# Patient Record
Sex: FEMALE | Race: WHITE | NOT HISPANIC OR LATINO
[De-identification: names, ages, dates, MRNs, and addresses within clinical notes are randomized per-mention and may not be internally consistent; named-entity substitution may affect disease eponyms.]

---

## 2017-11-02 ENCOUNTER — APPOINTMENT (OUTPATIENT)
Dept: INTERNAL MEDICINE | Facility: CLINIC | Age: 67
End: 2017-11-02
Payer: MEDICARE

## 2017-11-02 ENCOUNTER — NON-APPOINTMENT (OUTPATIENT)
Age: 67
End: 2017-11-02

## 2017-11-02 VITALS — SYSTOLIC BLOOD PRESSURE: 120 MMHG | DIASTOLIC BLOOD PRESSURE: 70 MMHG

## 2017-11-02 VITALS — HEIGHT: 60 IN | WEIGHT: 133 LBS | BODY MASS INDEX: 26.11 KG/M2

## 2017-11-02 DIAGNOSIS — Z80.3 FAMILY HISTORY OF MALIGNANT NEOPLASM OF BREAST: ICD-10-CM

## 2017-11-02 LAB
BILIRUB UR QL STRIP: NORMAL
CLARITY UR: CLEAR
COLLECTION METHOD: NORMAL
GLUCOSE UR-MCNC: NORMAL
HCG UR QL: 0.2 EU/DL
HGB UR QL STRIP.AUTO: NORMAL
KETONES UR-MCNC: NORMAL
LEUKOCYTE ESTERASE UR QL STRIP: NORMAL
NITRITE UR QL STRIP: NORMAL
PH UR STRIP: 6
PROT UR STRIP-MCNC: NORMAL
SP GR UR STRIP: 1.01

## 2017-11-02 PROCEDURE — 36415 COLL VENOUS BLD VENIPUNCTURE: CPT

## 2017-11-02 PROCEDURE — 99204 OFFICE O/P NEW MOD 45 MIN: CPT | Mod: 25

## 2017-11-02 PROCEDURE — 81003 URINALYSIS AUTO W/O SCOPE: CPT | Mod: QW

## 2017-11-02 PROCEDURE — 93000 ELECTROCARDIOGRAM COMPLETE: CPT

## 2017-11-02 RX ORDER — ALPRAZOLAM 0.25 MG/1
0.25 TABLET ORAL
Refills: 0 | Status: DISCONTINUED | COMMUNITY
End: 2017-11-02

## 2017-11-03 ENCOUNTER — LABORATORY RESULT (OUTPATIENT)
Age: 67
End: 2017-11-03

## 2017-11-03 ENCOUNTER — APPOINTMENT (OUTPATIENT)
Dept: INTERNAL MEDICINE | Facility: CLINIC | Age: 67
End: 2017-11-03
Payer: MEDICARE

## 2017-11-03 LAB
ALBUMIN SERPL ELPH-MCNC: 4 G/DL
ALP BLD-CCNC: 45 U/L
ALT SERPL-CCNC: 14 U/L
ANION GAP SERPL CALC-SCNC: 15 MMOL/L
AST SERPL-CCNC: 19 U/L
BASOPHILS # BLD AUTO: 0.02 K/UL
BASOPHILS NFR BLD AUTO: 0.3 %
BILIRUB SERPL-MCNC: 0.3 MG/DL
BUN SERPL-MCNC: 17 MG/DL
CALCIUM SERPL-MCNC: 9.4 MG/DL
CHLORIDE SERPL-SCNC: 101 MMOL/L
CHOLEST SERPL-MCNC: 182 MG/DL
CHOLEST/HDLC SERPL: 5.1 RATIO
CO2 SERPL-SCNC: 25 MMOL/L
CREAT SERPL-MCNC: 0.65 MG/DL
EOSINOPHIL # BLD AUTO: 0.21 K/UL
EOSINOPHIL NFR BLD AUTO: 2.8 %
GLUCOSE SERPL-MCNC: 85 MG/DL
HBA1C MFR BLD HPLC: 5.3 %
HCT VFR BLD CALC: 42.8 %
HDLC SERPL-MCNC: 36 MG/DL
HGB BLD-MCNC: 14.1 G/DL
IMM GRANULOCYTES NFR BLD AUTO: 0.4 %
LDLC SERPL CALC-MCNC: 122 MG/DL
LYMPHOCYTES # BLD AUTO: 1.88 K/UL
LYMPHOCYTES NFR BLD AUTO: 25.3 %
MAN DIFF?: NORMAL
MCHC RBC-ENTMCNC: 29.2 PG
MCHC RBC-ENTMCNC: 32.9 GM/DL
MCV RBC AUTO: 88.6 FL
MONOCYTES # BLD AUTO: 0.57 K/UL
MONOCYTES NFR BLD AUTO: 7.7 %
NEUTROPHILS # BLD AUTO: 4.73 K/UL
NEUTROPHILS NFR BLD AUTO: 63.5 %
PLATELET # BLD AUTO: 244 K/UL
POTASSIUM SERPL-SCNC: 4.5 MMOL/L
PROT SERPL-MCNC: 6.9 G/DL
RBC # BLD: 4.83 M/UL
RBC # FLD: 14.3 %
SAVE SPECIMEN: NORMAL
SODIUM SERPL-SCNC: 141 MMOL/L
TRIGL SERPL-MCNC: 119 MG/DL
URATE SERPL-MCNC: 4 MG/DL
WBC # FLD AUTO: 7.44 K/UL

## 2017-11-03 PROCEDURE — 36415 COLL VENOUS BLD VENIPUNCTURE: CPT

## 2017-11-07 LAB
25(OH)D3 SERPL-MCNC: 48.2 NG/ML
FERRITIN SERPL-MCNC: 75 NG/ML
T3RU NFR SERPL: 1.08 INDEX
T4 SERPL-MCNC: 6.7 UG/DL
TSH SERPL-ACNC: 1.59 UIU/ML
VIT B12 SERPL-MCNC: 951 PG/ML

## 2017-11-08 ENCOUNTER — APPOINTMENT (OUTPATIENT)
Dept: CARDIOLOGY | Facility: CLINIC | Age: 67
End: 2017-11-08
Payer: MEDICARE

## 2017-11-08 PROCEDURE — 93306 TTE W/DOPPLER COMPLETE: CPT

## 2017-11-11 ENCOUNTER — TRANSCRIPTION ENCOUNTER (OUTPATIENT)
Age: 67
End: 2017-11-11

## 2017-11-11 ENCOUNTER — FORM ENCOUNTER (OUTPATIENT)
Age: 67
End: 2017-11-11

## 2017-11-12 ENCOUNTER — OUTPATIENT (OUTPATIENT)
Dept: OUTPATIENT SERVICES | Facility: HOSPITAL | Age: 67
LOS: 1 days | End: 2017-11-12
Payer: MEDICARE

## 2017-11-12 ENCOUNTER — APPOINTMENT (OUTPATIENT)
Dept: MRI IMAGING | Facility: IMAGING CENTER | Age: 67
End: 2017-11-12
Payer: MEDICARE

## 2017-11-12 DIAGNOSIS — H81.10 BENIGN PAROXYSMAL VERTIGO, UNSPECIFIED EAR: ICD-10-CM

## 2017-11-12 DIAGNOSIS — H91.93 UNSPECIFIED HEARING LOSS, BILATERAL: ICD-10-CM

## 2017-11-12 PROCEDURE — 70551 MRI BRAIN STEM W/O DYE: CPT

## 2017-11-12 PROCEDURE — 70551 MRI BRAIN STEM W/O DYE: CPT | Mod: 26

## 2017-11-30 ENCOUNTER — RESULT REVIEW (OUTPATIENT)
Age: 67
End: 2017-11-30

## 2017-12-11 ENCOUNTER — APPOINTMENT (OUTPATIENT)
Dept: INTERNAL MEDICINE | Facility: CLINIC | Age: 67
End: 2017-12-11
Payer: MEDICARE

## 2017-12-11 ENCOUNTER — CLINICAL ADVICE (OUTPATIENT)
Age: 67
End: 2017-12-11

## 2017-12-11 VITALS — BODY MASS INDEX: 25.91 KG/M2 | HEIGHT: 60 IN | WEIGHT: 132 LBS

## 2017-12-11 VITALS — DIASTOLIC BLOOD PRESSURE: 70 MMHG | SYSTOLIC BLOOD PRESSURE: 120 MMHG

## 2017-12-11 PROCEDURE — 99214 OFFICE O/P EST MOD 30 MIN: CPT

## 2018-03-15 ENCOUNTER — LABORATORY RESULT (OUTPATIENT)
Age: 68
End: 2018-03-15

## 2018-03-15 ENCOUNTER — APPOINTMENT (OUTPATIENT)
Dept: INTERNAL MEDICINE | Facility: CLINIC | Age: 68
End: 2018-03-15
Payer: MEDICARE

## 2018-03-15 DIAGNOSIS — H91.93 UNSPECIFIED HEARING LOSS, BILATERAL: ICD-10-CM

## 2018-03-15 PROCEDURE — 36415 COLL VENOUS BLD VENIPUNCTURE: CPT

## 2018-03-16 LAB
25(OH)D3 SERPL-MCNC: 58.2 NG/ML
ALBUMIN SERPL ELPH-MCNC: 4.2 G/DL
ALP BLD-CCNC: 48 U/L
ALT SERPL-CCNC: 10 U/L
ANION GAP SERPL CALC-SCNC: 12 MMOL/L
AST SERPL-CCNC: 20 U/L
BASOPHILS # BLD AUTO: 0.02 K/UL
BASOPHILS NFR BLD AUTO: 0.3 %
BILIRUB SERPL-MCNC: 0.3 MG/DL
BUN SERPL-MCNC: 17 MG/DL
CALCIUM SERPL-MCNC: 10.2 MG/DL
CHLORIDE SERPL-SCNC: 99 MMOL/L
CHOLEST SERPL-MCNC: 190 MG/DL
CHOLEST/HDLC SERPL: 4.9 RATIO
CO2 SERPL-SCNC: 27 MMOL/L
CREAT SERPL-MCNC: 0.62 MG/DL
EOSINOPHIL # BLD AUTO: 0.12 K/UL
EOSINOPHIL NFR BLD AUTO: 1.9 %
GLUCOSE SERPL-MCNC: 87 MG/DL
HBA1C MFR BLD HPLC: 5.3 %
HCT VFR BLD CALC: 45.2 %
HDLC SERPL-MCNC: 39 MG/DL
HGB BLD-MCNC: 14.1 G/DL
IMM GRANULOCYTES NFR BLD AUTO: 0.3 %
LDLC SERPL CALC-MCNC: 128 MG/DL
LYMPHOCYTES # BLD AUTO: 1.84 K/UL
LYMPHOCYTES NFR BLD AUTO: 28.4 %
MAN DIFF?: NORMAL
MCHC RBC-ENTMCNC: 29 PG
MCHC RBC-ENTMCNC: 31.2 GM/DL
MCV RBC AUTO: 92.8 FL
MONOCYTES # BLD AUTO: 0.42 K/UL
MONOCYTES NFR BLD AUTO: 6.5 %
NEUTROPHILS # BLD AUTO: 4.05 K/UL
NEUTROPHILS NFR BLD AUTO: 62.6 %
PLATELET # BLD AUTO: 253 K/UL
POTASSIUM SERPL-SCNC: 5 MMOL/L
PROT SERPL-MCNC: 7.2 G/DL
RBC # BLD: 4.87 M/UL
RBC # FLD: 15.1 %
SAVE SPECIMEN: NORMAL
SODIUM SERPL-SCNC: 138 MMOL/L
T3RU NFR SERPL: 1.06 INDEX
TRIGL SERPL-MCNC: 113 MG/DL
TSH SERPL-ACNC: 1.65 UIU/ML
URATE SERPL-MCNC: 4.7 MG/DL
WBC # FLD AUTO: 6.47 K/UL

## 2018-03-19 ENCOUNTER — RX RENEWAL (OUTPATIENT)
Age: 68
End: 2018-03-19

## 2018-03-19 ENCOUNTER — MEDICATION RENEWAL (OUTPATIENT)
Age: 68
End: 2018-03-19

## 2018-05-04 ENCOUNTER — RX RENEWAL (OUTPATIENT)
Age: 68
End: 2018-05-04

## 2018-08-01 ENCOUNTER — LABORATORY RESULT (OUTPATIENT)
Age: 68
End: 2018-08-01

## 2018-08-01 ENCOUNTER — APPOINTMENT (OUTPATIENT)
Dept: INTERNAL MEDICINE | Facility: CLINIC | Age: 68
End: 2018-08-01
Payer: MEDICARE

## 2018-08-01 PROCEDURE — 36415 COLL VENOUS BLD VENIPUNCTURE: CPT

## 2018-08-03 LAB
ALBUMIN SERPL ELPH-MCNC: 4.3 G/DL
ALP BLD-CCNC: 54 U/L
ALT SERPL-CCNC: 18 U/L
ANION GAP SERPL CALC-SCNC: 12 MMOL/L
AST SERPL-CCNC: 17 U/L
BILIRUB SERPL-MCNC: 0.3 MG/DL
BUN SERPL-MCNC: 16 MG/DL
CALCIUM SERPL-MCNC: 9.3 MG/DL
CHLORIDE SERPL-SCNC: 97 MMOL/L
CHOLEST SERPL-MCNC: 176 MG/DL
CHOLEST/HDLC SERPL: 5.3 RATIO
CO2 SERPL-SCNC: 28 MMOL/L
CREAT SERPL-MCNC: 0.48 MG/DL
GLUCOSE SERPL-MCNC: 92 MG/DL
HDLC SERPL-MCNC: 33 MG/DL
LDLC SERPL CALC-MCNC: 115 MG/DL
POTASSIUM SERPL-SCNC: 4.6 MMOL/L
PROT SERPL-MCNC: 6.8 G/DL
SODIUM SERPL-SCNC: 137 MMOL/L
TRIGL SERPL-MCNC: 140 MG/DL

## 2018-08-19 ENCOUNTER — FORM ENCOUNTER (OUTPATIENT)
Age: 68
End: 2018-08-19

## 2018-08-20 ENCOUNTER — RX RENEWAL (OUTPATIENT)
Age: 68
End: 2018-08-20

## 2018-08-20 ENCOUNTER — APPOINTMENT (OUTPATIENT)
Dept: RADIOLOGY | Facility: IMAGING CENTER | Age: 68
End: 2018-08-20
Payer: MEDICARE

## 2018-08-20 ENCOUNTER — APPOINTMENT (OUTPATIENT)
Dept: INTERNAL MEDICINE | Facility: CLINIC | Age: 68
End: 2018-08-20
Payer: MEDICARE

## 2018-08-20 ENCOUNTER — OUTPATIENT (OUTPATIENT)
Dept: OUTPATIENT SERVICES | Facility: HOSPITAL | Age: 68
LOS: 1 days | End: 2018-08-20
Payer: MEDICARE

## 2018-08-20 VITALS — SYSTOLIC BLOOD PRESSURE: 120 MMHG | DIASTOLIC BLOOD PRESSURE: 70 MMHG

## 2018-08-20 DIAGNOSIS — M54.12 RADICULOPATHY, CERVICAL REGION: ICD-10-CM

## 2018-08-20 PROCEDURE — 99214 OFFICE O/P EST MOD 30 MIN: CPT

## 2018-08-20 PROCEDURE — 72040 X-RAY EXAM NECK SPINE 2-3 VW: CPT

## 2018-08-20 PROCEDURE — 72040 X-RAY EXAM NECK SPINE 2-3 VW: CPT | Mod: 26

## 2018-08-20 NOTE — HISTORY OF PRESENT ILLNESS
[de-identified] : This is a patient with history of mild aortic insufficiency, osteoporosis, vertigo who's here today because of a painful cervical radiculopathy. She denies any neurological deficits headaches or diplopia. She denies stiffness of the neck or fever. Denies travels out of the country. The patient lifted a heavy object and then developed pain in the cervical area. She'll recent MRI of the brain which was normal.

## 2018-08-20 NOTE — PHYSICAL EXAM
[Normal Sclera/Conjunctiva] : normal sclera/conjunctiva [EOMI] : extraocular movements intact [Normal Outer Ear/Nose] : the outer ears and nose were normal in appearance [Normal TMs] : both tympanic membranes were normal [No Respiratory Distress] : no respiratory distress  [Clear to Auscultation] : lungs were clear to auscultation bilaterally [No Accessory Muscle Use] : no accessory muscle use [No Carotid Bruits] : no carotid bruits [No Abdominal Bruit] : a ~M bruit was not heard ~T in the abdomen [Normal Gait] : normal gait [Coordination Grossly Intact] : coordination grossly intact [No Focal Deficits] : no focal deficits [Deep Tendon Reflexes (DTR)] : deep tendon reflexes were 2+ and symmetric [de-identified] : Grade 1/6 syr

## 2018-08-20 NOTE — ASSESSMENT
[FreeTextEntry1] : The patient's vital signs were stable. Her physical examination including a detailed neurological examination was normal. My feeling is that the patient probably has a cervical radiculopathy. I referred her for x-rays of her cervical spine and started her on Flexeril 10 mg t.i.d. and also Motrin to be taken with food 400 mg t.i.d. and bedrest.

## 2018-08-20 NOTE — REVIEW OF SYSTEMS
[Shortness Of Breath] : no shortness of breath [Cough] : no cough [Dyspnea on Exertion] : no dyspnea on exertion [Joint Pain] : joint pain [Joint Stiffness] : joint stiffness [Muscle Weakness] : no muscle weakness [Muscle Pain] : muscle pain [Back Pain] : back pain [Dizziness] : dizziness [Confusion] : no confusion [Anxiety] : anxiety [Negative] : Cardiovascular [FreeTextEntry1] : Neck pain

## 2018-11-01 ENCOUNTER — APPOINTMENT (OUTPATIENT)
Dept: INTERNAL MEDICINE | Facility: CLINIC | Age: 68
End: 2018-11-01
Payer: MEDICARE

## 2018-11-01 ENCOUNTER — LABORATORY RESULT (OUTPATIENT)
Age: 68
End: 2018-11-01

## 2018-11-01 LAB
25(OH)D3 SERPL-MCNC: 68.3 NG/ML
ALBUMIN SERPL ELPH-MCNC: 4.4 G/DL
ALP BLD-CCNC: 59 U/L
ALT SERPL-CCNC: 16 U/L
ANION GAP SERPL CALC-SCNC: 11 MMOL/L
AST SERPL-CCNC: 18 U/L
BASOPHILS # BLD AUTO: 0.02 K/UL
BASOPHILS NFR BLD AUTO: 0.3 %
BILIRUB SERPL-MCNC: 0.4 MG/DL
BUN SERPL-MCNC: 18 MG/DL
CALCIUM SERPL-MCNC: 9.9 MG/DL
CHLORIDE SERPL-SCNC: 101 MMOL/L
CHOLEST SERPL-MCNC: 184 MG/DL
CHOLEST/HDLC SERPL: 4.8 RATIO
CO2 SERPL-SCNC: 30 MMOL/L
CREAT SERPL-MCNC: 0.62 MG/DL
EOSINOPHIL # BLD AUTO: 0.17 K/UL
EOSINOPHIL NFR BLD AUTO: 2.7 %
HBA1C MFR BLD HPLC: 4.9 %
HCT VFR BLD CALC: 40.8 %
HDLC SERPL-MCNC: 38 MG/DL
HGB BLD-MCNC: 13.5 G/DL
IMM GRANULOCYTES NFR BLD AUTO: 0.2 %
LDLC SERPL CALC-MCNC: 123 MG/DL
LYMPHOCYTES # BLD AUTO: 2 K/UL
LYMPHOCYTES NFR BLD AUTO: 31.4 %
MAN DIFF?: NORMAL
MCHC RBC-ENTMCNC: 28.4 PG
MCHC RBC-ENTMCNC: 33.1 GM/DL
MCV RBC AUTO: 85.7 FL
MONOCYTES # BLD AUTO: 0.42 K/UL
MONOCYTES NFR BLD AUTO: 6.6 %
NEUTROPHILS # BLD AUTO: 3.74 K/UL
NEUTROPHILS NFR BLD AUTO: 58.8 %
PLATELET # BLD AUTO: 249 K/UL
POTASSIUM SERPL-SCNC: 5 MMOL/L
PROT SERPL-MCNC: 7.3 G/DL
RBC # BLD: 4.76 M/UL
RBC # FLD: 14.4 %
SAVE SPECIMEN: NORMAL
SODIUM SERPL-SCNC: 142 MMOL/L
TRIGL SERPL-MCNC: 116 MG/DL
URATE SERPL-MCNC: 5.1 MG/DL
WBC # FLD AUTO: 6.36 K/UL

## 2018-11-01 PROCEDURE — 36415 COLL VENOUS BLD VENIPUNCTURE: CPT

## 2018-11-02 LAB
FERRITIN SERPL-MCNC: 93 NG/ML
GLUCOSE SERPL-MCNC: 86 MG/DL
T3RU NFR SERPL: 1.1 INDEX
TSH SERPL-ACNC: 1.98 UIU/ML
VIT B12 SERPL-MCNC: 1028 PG/ML

## 2018-11-08 ENCOUNTER — NON-APPOINTMENT (OUTPATIENT)
Age: 68
End: 2018-11-08

## 2018-11-08 ENCOUNTER — APPOINTMENT (OUTPATIENT)
Dept: INTERNAL MEDICINE | Facility: CLINIC | Age: 68
End: 2018-11-08
Payer: MEDICARE

## 2018-11-08 ENCOUNTER — CLINICAL ADVICE (OUTPATIENT)
Age: 68
End: 2018-11-08

## 2018-11-08 VITALS — DIASTOLIC BLOOD PRESSURE: 70 MMHG | SYSTOLIC BLOOD PRESSURE: 120 MMHG

## 2018-11-08 VITALS — WEIGHT: 137 LBS | BODY MASS INDEX: 26.9 KG/M2 | HEIGHT: 60 IN

## 2018-11-08 LAB
BILIRUB UR QL STRIP: NORMAL
CLARITY UR: CLEAR
COLLECTION METHOD: NORMAL
GLUCOSE UR-MCNC: NORMAL
HCG UR QL: 0.2 EU/DL
HGB UR QL STRIP.AUTO: NORMAL
KETONES UR-MCNC: NORMAL
LEUKOCYTE ESTERASE UR QL STRIP: NORMAL
NITRITE UR QL STRIP: NORMAL
PH UR STRIP: 7.5
PROT UR STRIP-MCNC: NORMAL
SP GR UR STRIP: 1.01

## 2018-11-08 PROCEDURE — 36415 COLL VENOUS BLD VENIPUNCTURE: CPT

## 2018-11-08 PROCEDURE — G0439: CPT | Mod: GA

## 2018-11-08 PROCEDURE — 93000 ELECTROCARDIOGRAM COMPLETE: CPT

## 2018-11-18 ENCOUNTER — RX RENEWAL (OUTPATIENT)
Age: 68
End: 2018-11-18

## 2019-01-31 ENCOUNTER — MEDICATION RENEWAL (OUTPATIENT)
Age: 69
End: 2019-01-31

## 2019-02-06 ENCOUNTER — APPOINTMENT (OUTPATIENT)
Dept: INTERNAL MEDICINE | Facility: CLINIC | Age: 69
End: 2019-02-06
Payer: MEDICARE

## 2019-02-06 LAB — SAVE SPECIMEN: NORMAL

## 2019-02-06 PROCEDURE — 36415 COLL VENOUS BLD VENIPUNCTURE: CPT

## 2019-02-07 LAB
ALBUMIN SERPL ELPH-MCNC: 4.3 G/DL
ALP BLD-CCNC: 57 U/L
ALT SERPL-CCNC: 13 U/L
ANION GAP SERPL CALC-SCNC: 14 MMOL/L
AST SERPL-CCNC: 16 U/L
BILIRUB SERPL-MCNC: 0.4 MG/DL
BUN SERPL-MCNC: 18 MG/DL
CALCIUM SERPL-MCNC: 9.7 MG/DL
CHLORIDE SERPL-SCNC: 100 MMOL/L
CHOLEST SERPL-MCNC: 188 MG/DL
CHOLEST/HDLC SERPL: 5.2 RATIO
CO2 SERPL-SCNC: 26 MMOL/L
CREAT SERPL-MCNC: 0.57 MG/DL
GLUCOSE SERPL-MCNC: 93 MG/DL
HDLC SERPL-MCNC: 36 MG/DL
LDLC SERPL CALC-MCNC: 127 MG/DL
POTASSIUM SERPL-SCNC: 4.1 MMOL/L
PROT SERPL-MCNC: 7.1 G/DL
SODIUM SERPL-SCNC: 140 MMOL/L
TRIGL SERPL-MCNC: 126 MG/DL

## 2019-02-15 ENCOUNTER — APPOINTMENT (OUTPATIENT)
Dept: INTERNAL MEDICINE | Facility: CLINIC | Age: 69
End: 2019-02-15
Payer: MEDICARE

## 2019-02-15 VITALS — HEIGHT: 60 IN | WEIGHT: 142 LBS | BODY MASS INDEX: 27.88 KG/M2

## 2019-02-15 VITALS — SYSTOLIC BLOOD PRESSURE: 130 MMHG | DIASTOLIC BLOOD PRESSURE: 70 MMHG

## 2019-02-15 DIAGNOSIS — R01.1 CARDIAC MURMUR, UNSPECIFIED: ICD-10-CM

## 2019-02-15 PROCEDURE — 99214 OFFICE O/P EST MOD 30 MIN: CPT

## 2019-02-15 NOTE — PHYSICAL EXAM
[Normal Sclera/Conjunctiva] : normal sclera/conjunctiva [EOMI] : extraocular movements intact [Normal TMs] : both tympanic membranes were normal [No Respiratory Distress] : no respiratory distress  [Clear to Auscultation] : lungs were clear to auscultation bilaterally [No Accessory Muscle Use] : no accessory muscle use [No Carotid Bruits] : no carotid bruits [No Abdominal Bruit] : a ~M bruit was not heard ~T in the abdomen [Normal Gait] : normal gait [Coordination Grossly Intact] : coordination grossly intact [No Focal Deficits] : no focal deficits [Deep Tendon Reflexes (DTR)] : deep tendon reflexes were 2+ and symmetric [de-identified] : Grade 1/6 syr

## 2019-02-15 NOTE — ASSESSMENT
[FreeTextEntry1] : Problems\par \par Hypercholesterolemia- the patient's blood was drawn and the results showed that she is doing very well on Crestor. She was advised to repeat the blood in 3 months\par Excessive snoring- she was referred for a sleep apnea evaluation\par Mood disorder- she continues on her Zoloft and p.r.n. Xanax\par Heart murmur- an echocardiogram was ordered

## 2019-03-19 ENCOUNTER — TRANSCRIPTION ENCOUNTER (OUTPATIENT)
Age: 69
End: 2019-03-19

## 2019-04-12 ENCOUNTER — RX RENEWAL (OUTPATIENT)
Age: 69
End: 2019-04-12

## 2019-04-29 ENCOUNTER — FORM ENCOUNTER (OUTPATIENT)
Age: 69
End: 2019-04-29

## 2019-04-30 ENCOUNTER — APPOINTMENT (OUTPATIENT)
Dept: CT IMAGING | Facility: IMAGING CENTER | Age: 69
End: 2019-04-30
Payer: MEDICARE

## 2019-04-30 ENCOUNTER — OUTPATIENT (OUTPATIENT)
Dept: OUTPATIENT SERVICES | Facility: HOSPITAL | Age: 69
LOS: 1 days | End: 2019-04-30
Payer: MEDICARE

## 2019-04-30 DIAGNOSIS — J38.01 PARALYSIS OF VOCAL CORDS AND LARYNX, UNILATERAL: ICD-10-CM

## 2019-04-30 PROCEDURE — 71260 CT THORAX DX C+: CPT

## 2019-04-30 PROCEDURE — 70491 CT SOFT TISSUE NECK W/DYE: CPT

## 2019-04-30 PROCEDURE — 70491 CT SOFT TISSUE NECK W/DYE: CPT | Mod: 26

## 2019-04-30 PROCEDURE — 71260 CT THORAX DX C+: CPT | Mod: 26

## 2019-04-30 PROCEDURE — 82565 ASSAY OF CREATININE: CPT

## 2019-05-07 ENCOUNTER — APPOINTMENT (OUTPATIENT)
Dept: MRI IMAGING | Facility: CLINIC | Age: 69
End: 2019-05-07
Payer: MEDICARE

## 2019-05-07 ENCOUNTER — OUTPATIENT (OUTPATIENT)
Dept: OUTPATIENT SERVICES | Facility: HOSPITAL | Age: 69
LOS: 1 days | End: 2019-05-07
Payer: MEDICARE

## 2019-05-07 DIAGNOSIS — Z00.8 ENCOUNTER FOR OTHER GENERAL EXAMINATION: ICD-10-CM

## 2019-05-07 PROCEDURE — A9585: CPT

## 2019-05-07 PROCEDURE — 70543 MRI ORBT/FAC/NCK W/O &W/DYE: CPT

## 2019-05-07 PROCEDURE — 70543 MRI ORBT/FAC/NCK W/O &W/DYE: CPT | Mod: 26

## 2019-05-29 ENCOUNTER — APPOINTMENT (OUTPATIENT)
Dept: INTERNAL MEDICINE | Facility: CLINIC | Age: 69
End: 2019-05-29
Payer: MEDICARE

## 2019-05-29 LAB
BASOPHILS # BLD AUTO: 0.04 K/UL
BASOPHILS NFR BLD AUTO: 0.5 %
EOSINOPHIL # BLD AUTO: 0.21 K/UL
EOSINOPHIL NFR BLD AUTO: 2.6 %
HCT VFR BLD CALC: 43.2 %
HGB BLD-MCNC: 13.8 G/DL
IMM GRANULOCYTES NFR BLD AUTO: 0.5 %
LYMPHOCYTES # BLD AUTO: 1.91 K/UL
LYMPHOCYTES NFR BLD AUTO: 23.2 %
MAN DIFF?: NORMAL
MCHC RBC-ENTMCNC: 28.4 PG
MCHC RBC-ENTMCNC: 31.9 GM/DL
MCV RBC AUTO: 88.9 FL
MONOCYTES # BLD AUTO: 0.63 K/UL
MONOCYTES NFR BLD AUTO: 7.7 %
NEUTROPHILS # BLD AUTO: 5.39 K/UL
NEUTROPHILS NFR BLD AUTO: 65.5 %
PLATELET # BLD AUTO: 246 K/UL
RBC # BLD: 4.86 M/UL
RBC # FLD: 14.4 %
SAVE SPECIMEN: NORMAL
WBC # FLD AUTO: 8.22 K/UL

## 2019-05-29 PROCEDURE — 36415 COLL VENOUS BLD VENIPUNCTURE: CPT

## 2019-05-30 LAB
ALBUMIN SERPL ELPH-MCNC: 4.1 G/DL
ALP BLD-CCNC: 72 U/L
ALT SERPL-CCNC: 28 U/L
ANION GAP SERPL CALC-SCNC: 10 MMOL/L
AST SERPL-CCNC: 19 U/L
BILIRUB SERPL-MCNC: 0.3 MG/DL
BUN SERPL-MCNC: 12 MG/DL
CALCIUM SERPL-MCNC: 9.7 MG/DL
CHLORIDE SERPL-SCNC: 101 MMOL/L
CHOLEST SERPL-MCNC: 177 MG/DL
CHOLEST/HDLC SERPL: 6.8 RATIO
CO2 SERPL-SCNC: 29 MMOL/L
CREAT SERPL-MCNC: 0.54 MG/DL
GLUCOSE SERPL-MCNC: 103 MG/DL
HDLC SERPL-MCNC: 26 MG/DL
LDLC SERPL CALC-MCNC: 103 MG/DL
POTASSIUM SERPL-SCNC: 4.4 MMOL/L
PROT SERPL-MCNC: 6.5 G/DL
SODIUM SERPL-SCNC: 140 MMOL/L
TRIGL SERPL-MCNC: 242 MG/DL

## 2019-07-11 ENCOUNTER — OUTPATIENT (OUTPATIENT)
Dept: OUTPATIENT SERVICES | Facility: HOSPITAL | Age: 69
LOS: 1 days | End: 2019-07-11
Payer: MEDICARE

## 2019-07-11 ENCOUNTER — APPOINTMENT (OUTPATIENT)
Dept: MRI IMAGING | Facility: CLINIC | Age: 69
End: 2019-07-11
Payer: MEDICARE

## 2019-07-11 DIAGNOSIS — Z00.8 ENCOUNTER FOR OTHER GENERAL EXAMINATION: ICD-10-CM

## 2019-07-11 PROCEDURE — 70544 MR ANGIOGRAPHY HEAD W/O DYE: CPT

## 2019-07-11 PROCEDURE — 70544 MR ANGIOGRAPHY HEAD W/O DYE: CPT | Mod: 26

## 2019-08-06 ENCOUNTER — MEDICATION RENEWAL (OUTPATIENT)
Age: 69
End: 2019-08-06

## 2019-09-18 ENCOUNTER — APPOINTMENT (OUTPATIENT)
Dept: INTERNAL MEDICINE | Facility: CLINIC | Age: 69
End: 2019-09-18
Payer: MEDICARE

## 2019-09-18 PROCEDURE — 36415 COLL VENOUS BLD VENIPUNCTURE: CPT

## 2019-09-22 LAB
CHOLEST SERPL-MCNC: 158 MG/DL
CHOLEST/HDLC SERPL: 4.4 RATIO
HDLC SERPL-MCNC: 36 MG/DL
LDLC SERPL CALC-MCNC: 95 MG/DL
TRIGL SERPL-MCNC: 134 MG/DL

## 2019-09-30 ENCOUNTER — RX RENEWAL (OUTPATIENT)
Age: 69
End: 2019-09-30

## 2019-11-06 ENCOUNTER — LABORATORY RESULT (OUTPATIENT)
Age: 69
End: 2019-11-06

## 2019-11-06 ENCOUNTER — APPOINTMENT (OUTPATIENT)
Dept: INTERNAL MEDICINE | Facility: CLINIC | Age: 69
End: 2019-11-06
Payer: MEDICARE

## 2019-11-06 PROCEDURE — 36415 COLL VENOUS BLD VENIPUNCTURE: CPT

## 2019-11-07 LAB
25(OH)D3 SERPL-MCNC: 39.7 NG/ML
ALBUMIN SERPL ELPH-MCNC: 4.5 G/DL
ALP BLD-CCNC: 64 U/L
ALT SERPL-CCNC: 18 U/L
ANION GAP SERPL CALC-SCNC: 12 MMOL/L
AST SERPL-CCNC: 17 U/L
BASOPHILS # BLD AUTO: 0.02 K/UL
BASOPHILS NFR BLD AUTO: 0.3 %
BILIRUB SERPL-MCNC: 0.3 MG/DL
BUN SERPL-MCNC: 17 MG/DL
CALCIUM SERPL-MCNC: 9.6 MG/DL
CHLORIDE SERPL-SCNC: 99 MMOL/L
CHOLEST SERPL-MCNC: 184 MG/DL
CHOLEST/HDLC SERPL: 5 RATIO
CO2 SERPL-SCNC: 29 MMOL/L
CREAT SERPL-MCNC: 0.57 MG/DL
EOSINOPHIL # BLD AUTO: 0.22 K/UL
EOSINOPHIL NFR BLD AUTO: 3.2 %
ESTIMATED AVERAGE GLUCOSE: 103 MG/DL
FERRITIN SERPL-MCNC: 74 NG/ML
GLUCOSE SERPL-MCNC: 81 MG/DL
HBA1C MFR BLD HPLC: 5.2 %
HCT VFR BLD CALC: 42.8 %
HDLC SERPL-MCNC: 37 MG/DL
HGB BLD-MCNC: 13.6 G/DL
IMM GRANULOCYTES NFR BLD AUTO: 0.3 %
LDLC SERPL CALC-MCNC: 119 MG/DL
LYMPHOCYTES # BLD AUTO: 1.77 K/UL
LYMPHOCYTES NFR BLD AUTO: 25.4 %
MAN DIFF?: NORMAL
MCHC RBC-ENTMCNC: 28.4 PG
MCHC RBC-ENTMCNC: 31.8 GM/DL
MCV RBC AUTO: 89.4 FL
MONOCYTES # BLD AUTO: 0.5 K/UL
MONOCYTES NFR BLD AUTO: 7.2 %
NEUTROPHILS # BLD AUTO: 4.43 K/UL
NEUTROPHILS NFR BLD AUTO: 63.6 %
PLATELET # BLD AUTO: 263 K/UL
POTASSIUM SERPL-SCNC: 3.9 MMOL/L
PROT SERPL-MCNC: 6.8 G/DL
RBC # BLD: 4.79 M/UL
RBC # FLD: 14.3 %
SODIUM SERPL-SCNC: 140 MMOL/L
T3RU NFR SERPL: 1 TBI
T4 SERPL-MCNC: 5.6 UG/DL
TRIGL SERPL-MCNC: 139 MG/DL
TSH SERPL-ACNC: 2 UIU/ML
URATE SERPL-MCNC: 4.6 MG/DL
VIT B12 SERPL-MCNC: 1078 PG/ML
WBC # FLD AUTO: 6.96 K/UL

## 2019-11-08 ENCOUNTER — RX RENEWAL (OUTPATIENT)
Age: 69
End: 2019-11-08

## 2019-11-14 ENCOUNTER — NON-APPOINTMENT (OUTPATIENT)
Age: 69
End: 2019-11-14

## 2019-11-14 ENCOUNTER — APPOINTMENT (OUTPATIENT)
Dept: INTERNAL MEDICINE | Facility: CLINIC | Age: 69
End: 2019-11-14
Payer: MEDICARE

## 2019-11-14 VITALS — HEIGHT: 60 IN | WEIGHT: 124 LBS | BODY MASS INDEX: 24.35 KG/M2

## 2019-11-14 VITALS — SYSTOLIC BLOOD PRESSURE: 130 MMHG | DIASTOLIC BLOOD PRESSURE: 70 MMHG

## 2019-11-14 DIAGNOSIS — M54.12 RADICULOPATHY, CERVICAL REGION: ICD-10-CM

## 2019-11-14 PROCEDURE — G0439: CPT | Mod: GA

## 2019-11-14 PROCEDURE — 93000 ELECTROCARDIOGRAM COMPLETE: CPT | Mod: 59

## 2019-11-14 PROCEDURE — 99213 OFFICE O/P EST LOW 20 MIN: CPT | Mod: 25

## 2019-11-14 RX ORDER — ROSUVASTATIN CALCIUM 5 MG/1
5 TABLET, FILM COATED ORAL
Qty: 90 | Refills: 3 | Status: DISCONTINUED | COMMUNITY
Start: 2017-11-02 | End: 2019-11-14

## 2019-11-14 NOTE — HISTORY OF PRESENT ILLNESS
[de-identified] : This is a 69-year-old patient with a history of cervical arthritis, sleep apnea, hypercholesterolemia who is here today for her annual examination

## 2019-11-14 NOTE — PHYSICAL EXAM
[No Acute Distress] : no acute distress [Well Nourished] : well nourished [Well Developed] : well developed [Well-Appearing] : well-appearing [Normal Sclera/Conjunctiva] : normal sclera/conjunctiva [PERRL] : pupils equal round and reactive to light [EOMI] : extraocular movements intact [Normal Outer Ear/Nose] : the outer ears and nose were normal in appearance [Normal Oropharynx] : the oropharynx was normal [No JVD] : no jugular venous distention [No Lymphadenopathy] : no lymphadenopathy [Supple] : supple [Thyroid Normal, No Nodules] : the thyroid was normal and there were no nodules present [No Respiratory Distress] : no respiratory distress  [No Accessory Muscle Use] : no accessory muscle use [Clear to Auscultation] : lungs were clear to auscultation bilaterally [Normal Rate] : normal rate  [Regular Rhythm] : with a regular rhythm [Normal S1, S2] : normal S1 and S2 [No Murmur] : no murmur heard [No Carotid Bruits] : no carotid bruits [No Abdominal Bruit] : a ~M bruit was not heard ~T in the abdomen [No Varicosities] : no varicosities [Pedal Pulses Present] : the pedal pulses are present [No Edema] : there was no peripheral edema [No Palpable Aorta] : no palpable aorta [No Extremity Clubbing/Cyanosis] : no extremity clubbing/cyanosis [Soft] : abdomen soft [Non Tender] : non-tender [Non-distended] : non-distended [No Masses] : no abdominal mass palpated [No HSM] : no HSM [Normal Bowel Sounds] : normal bowel sounds [Normal Posterior Cervical Nodes] : no posterior cervical lymphadenopathy [Normal Anterior Cervical Nodes] : no anterior cervical lymphadenopathy [No CVA Tenderness] : no CVA  tenderness [No Spinal Tenderness] : no spinal tenderness [No Rash] : no rash [Coordination Grossly Intact] : coordination grossly intact [No Focal Deficits] : no focal deficits [Deep Tendon Reflexes (DTR)] : deep tendon reflexes were 2+ and symmetric [Normal Gait] : normal gait [Normal Affect] : the affect was normal [Normal Insight/Judgement] : insight and judgment were intact [de-identified] : Osteoarthritis of the hands

## 2019-11-14 NOTE — ASSESSMENT
[FreeTextEntry1] : The patient's vital signs were stable her physical examination was normal except for osteoarthritis. She was referred to neurology for evaluation of cavernous hemangioma of the brain and she is due for an echocardiogram

## 2019-11-14 NOTE — HEALTH RISK ASSESSMENT
[] : No [No] : No [Never (0 pts)] : Never (0 points) [0] : 2) Feeling down, depressed, or hopeless: Not at all (0) [Audit-CScore] : 0 [[id number]] : 0 [Fully functional (bathing, dressing, toileting, transferring, walking, feeding)] : Fully functional (bathing, dressing, toileting, transferring, walking, feeding) [Fully functional (using the telephone, shopping, preparing meals, housekeeping, doing laundry, using] : Fully functional and needs no help or supervision to perform IADLs (using the telephone, shopping, preparing meals, housekeeping, doing laundry, using transportation, managing medications and managing finances) [With Patient/Caregiver] : With Patient/Caregiver [I will adhere to the patient's wishes as expressed in the advance directive except as noted below.] : I will adhere to the patient's wishes as expressed in the advance directive except as noted below [AdvancecareDate] : 11/14/19

## 2019-11-15 LAB
APPEARANCE: CLEAR
BACTERIA: NEGATIVE
BILIRUBIN URINE: NEGATIVE
BLOOD URINE: NEGATIVE
COLOR: NORMAL
GLUCOSE QUALITATIVE U: NEGATIVE
HYALINE CASTS: 0 /LPF
KETONES URINE: NEGATIVE
LEUKOCYTE ESTERASE URINE: NEGATIVE
MICROSCOPIC-UA: NORMAL
NITRITE URINE: NEGATIVE
PH URINE: 6.5
PROTEIN URINE: NEGATIVE
RED BLOOD CELLS URINE: 1 /HPF
SPECIFIC GRAVITY URINE: 1.01
SQUAMOUS EPITHELIAL CELLS: 0 /HPF
UROBILINOGEN URINE: NORMAL
WHITE BLOOD CELLS URINE: 1 /HPF

## 2020-02-20 ENCOUNTER — RX RENEWAL (OUTPATIENT)
Age: 70
End: 2020-02-20

## 2020-03-06 ENCOUNTER — APPOINTMENT (OUTPATIENT)
Dept: INTERNAL MEDICINE | Facility: CLINIC | Age: 70
End: 2020-03-06
Payer: MEDICARE

## 2020-03-06 PROCEDURE — 36415 COLL VENOUS BLD VENIPUNCTURE: CPT

## 2020-03-07 LAB
CHOLEST SERPL-MCNC: 163 MG/DL
CHOLEST/HDLC SERPL: 4.6 RATIO
HDLC SERPL-MCNC: 36 MG/DL
LDLC SERPL CALC-MCNC: 98 MG/DL
TRIGL SERPL-MCNC: 147 MG/DL

## 2020-03-08 LAB — SAVE SPECIMEN: NORMAL

## 2020-06-02 ENCOUNTER — RX RENEWAL (OUTPATIENT)
Age: 70
End: 2020-06-02

## 2020-07-04 PROCEDURE — 76705 ECHO EXAM OF ABDOMEN: CPT | Mod: 26

## 2020-07-04 PROCEDURE — 71045 X-RAY EXAM CHEST 1 VIEW: CPT | Mod: 26

## 2020-07-04 PROCEDURE — 99285 EMERGENCY DEPT VISIT HI MDM: CPT | Mod: CS

## 2020-07-05 ENCOUNTER — INPATIENT (INPATIENT)
Facility: HOSPITAL | Age: 70
LOS: 6 days | Discharge: HOME | End: 2020-07-12
Payer: MEDICARE

## 2020-07-05 PROCEDURE — 74177 CT ABD & PELVIS W/CONTRAST: CPT | Mod: 26

## 2020-07-07 PROCEDURE — 74018 RADEX ABDOMEN 1 VIEW: CPT | Mod: 26

## 2020-07-07 PROCEDURE — 71045 X-RAY EXAM CHEST 1 VIEW: CPT | Mod: 26

## 2020-07-08 PROCEDURE — 74018 RADEX ABDOMEN 1 VIEW: CPT | Mod: 26

## 2020-07-24 ENCOUNTER — APPOINTMENT (OUTPATIENT)
Dept: INTERNAL MEDICINE | Facility: CLINIC | Age: 70
End: 2020-07-24
Payer: MEDICARE

## 2020-07-24 VITALS — HEIGHT: 60 IN | WEIGHT: 118 LBS | TEMPERATURE: 97.7 F | BODY MASS INDEX: 23.16 KG/M2

## 2020-07-24 VITALS — SYSTOLIC BLOOD PRESSURE: 130 MMHG | DIASTOLIC BLOOD PRESSURE: 70 MMHG

## 2020-07-24 PROCEDURE — 99214 OFFICE O/P EST MOD 30 MIN: CPT | Mod: 25

## 2020-07-24 PROCEDURE — 36415 COLL VENOUS BLD VENIPUNCTURE: CPT

## 2020-07-24 RX ORDER — AMOXICILLIN 400 MG/5ML
400 FOR SUSPENSION ORAL TWICE DAILY
Qty: 1 | Refills: 0 | Status: DISCONTINUED | COMMUNITY
Start: 2018-05-04 | End: 2020-07-24

## 2020-07-24 NOTE — ASSESSMENT
[FreeTextEntry1] : Problems\par Confirms hemangioma\par Cecal volvulus\par Large bowel resection\par Hypercholesterolemia\par Obstructive sleep apnea\par Reflux esophagitis\par Stools\par Assessment\par This is patient who recently had an acute onset large bowel obstruction secondary to cecal volvulus. Then subsequently had large bowel resection. Her present complaints or reflux of acidic fluid and also loose stools. I advised the patient to go on a bland diet take a probiotic daily and also start omeprazole 20 mg b.i.d. I also instructed her to increase her Peptamen intake via eating bananas applesauce.

## 2020-07-24 NOTE — REVIEW OF SYSTEMS
[Abdominal Pain] : abdominal pain [Diarrhea] : diarrhea [Nausea] : nausea [Heartburn] : heartburn [Negative] : Genitourinary

## 2020-07-24 NOTE — HISTORY OF PRESENT ILLNESS
[de-identified] : Disposition on-year-old patient who recently was hospitalized emergently for severe abdominal pain associated with nausea and vomiting. A CT scan revealed small bowel obstruction she required emergency surgery which revealed a cecal volvulus she had a large bowel resection of present complaint is reflux and occasional watery diarrhea

## 2020-07-25 LAB
ALBUMIN SERPL ELPH-MCNC: 4.2 G/DL
ALP BLD-CCNC: 81 U/L
ALT SERPL-CCNC: 23 U/L
ANION GAP SERPL CALC-SCNC: 12 MMOL/L
APPEARANCE: CLEAR
AST SERPL-CCNC: 16 U/L
BACTERIA: NEGATIVE
BASOPHILS # BLD AUTO: 0.04 K/UL
BASOPHILS NFR BLD AUTO: 0.4 %
BILIRUB SERPL-MCNC: 0.3 MG/DL
BILIRUBIN URINE: NEGATIVE
BLOOD URINE: NEGATIVE
BUN SERPL-MCNC: 13 MG/DL
CALCIUM SERPL-MCNC: 9.5 MG/DL
CHLORIDE SERPL-SCNC: 97 MMOL/L
CO2 SERPL-SCNC: 28 MMOL/L
COLOR: NORMAL
CREAT SERPL-MCNC: 0.48 MG/DL
EOSINOPHIL # BLD AUTO: 0.18 K/UL
EOSINOPHIL NFR BLD AUTO: 1.9 %
FERRITIN SERPL-MCNC: 115 NG/ML
GLUCOSE QUALITATIVE U: NEGATIVE
GLUCOSE SERPL-MCNC: 125 MG/DL
HCT VFR BLD CALC: 39.5 %
HGB BLD-MCNC: 12.4 G/DL
HYALINE CASTS: 0 /LPF
IMM GRANULOCYTES NFR BLD AUTO: 0.7 %
KETONES URINE: NEGATIVE
LEUKOCYTE ESTERASE URINE: NEGATIVE
LYMPHOCYTES # BLD AUTO: 1.67 K/UL
LYMPHOCYTES NFR BLD AUTO: 17.5 %
MAN DIFF?: NORMAL
MCHC RBC-ENTMCNC: 29 PG
MCHC RBC-ENTMCNC: 31.4 GM/DL
MCV RBC AUTO: 92.5 FL
MICROSCOPIC-UA: NORMAL
MONOCYTES # BLD AUTO: 0.59 K/UL
MONOCYTES NFR BLD AUTO: 6.2 %
NEUTROPHILS # BLD AUTO: 7.02 K/UL
NEUTROPHILS NFR BLD AUTO: 73.3 %
NITRITE URINE: NEGATIVE
PH URINE: 6
PLATELET # BLD AUTO: 375 K/UL
POTASSIUM SERPL-SCNC: 4.1 MMOL/L
PROT SERPL-MCNC: 6.3 G/DL
PROTEIN URINE: NEGATIVE
RBC # BLD: 4.27 M/UL
RBC # FLD: 14.4 %
RED BLOOD CELLS URINE: 2 /HPF
SAVE SPECIMEN: NORMAL
SODIUM SERPL-SCNC: 137 MMOL/L
SPECIFIC GRAVITY URINE: 1.01
SQUAMOUS EPITHELIAL CELLS: 1 /HPF
UROBILINOGEN URINE: NORMAL
WBC # FLD AUTO: 9.57 K/UL
WHITE BLOOD CELLS URINE: 1 /HPF

## 2020-09-26 ENCOUNTER — TRANSCRIPTION ENCOUNTER (OUTPATIENT)
Age: 70
End: 2020-09-26

## 2020-11-17 ENCOUNTER — LABORATORY RESULT (OUTPATIENT)
Age: 70
End: 2020-11-17

## 2020-11-17 ENCOUNTER — APPOINTMENT (OUTPATIENT)
Dept: INTERNAL MEDICINE | Facility: CLINIC | Age: 70
End: 2020-11-17
Payer: MEDICARE

## 2020-11-17 ENCOUNTER — NON-APPOINTMENT (OUTPATIENT)
Age: 70
End: 2020-11-17

## 2020-11-17 VITALS — WEIGHT: 123 LBS | BODY MASS INDEX: 24.15 KG/M2 | TEMPERATURE: 97.9 F | HEIGHT: 60 IN

## 2020-11-17 LAB
25(OH)D3 SERPL-MCNC: 49.2 NG/ML
ALBUMIN SERPL ELPH-MCNC: 4.5 G/DL
ALP BLD-CCNC: 65 U/L
ALT SERPL-CCNC: 18 U/L
ANION GAP SERPL CALC-SCNC: 11 MMOL/L
APPEARANCE: CLEAR
AST SERPL-CCNC: 16 U/L
BACTERIA: NEGATIVE
BASOPHILS # BLD AUTO: 0.03 K/UL
BASOPHILS NFR BLD AUTO: 0.4 %
BILIRUB SERPL-MCNC: 0.3 MG/DL
BILIRUBIN URINE: NEGATIVE
BLOOD URINE: NEGATIVE
BUN SERPL-MCNC: 15 MG/DL
CALCIUM SERPL-MCNC: 9.9 MG/DL
CHLORIDE SERPL-SCNC: 99 MMOL/L
CHOLEST SERPL-MCNC: 180 MG/DL
CO2 SERPL-SCNC: 28 MMOL/L
COLOR: NORMAL
CREAT SERPL-MCNC: 0.54 MG/DL
EOSINOPHIL # BLD AUTO: 0.19 K/UL
EOSINOPHIL NFR BLD AUTO: 2.7 %
ESTIMATED AVERAGE GLUCOSE: 105 MG/DL
FERRITIN SERPL-MCNC: 52 NG/ML
FOLATE SERPL-MCNC: >20 NG/ML
GLUCOSE QUALITATIVE U: NEGATIVE
GLUCOSE SERPL-MCNC: 80 MG/DL
HBA1C MFR BLD HPLC: 5.3 %
HCT VFR BLD CALC: 41.9 %
HDLC SERPL-MCNC: 39 MG/DL
HGB BLD-MCNC: 13.5 G/DL
HYALINE CASTS: 0 /LPF
IMM GRANULOCYTES NFR BLD AUTO: 0.3 %
KETONES URINE: NEGATIVE
LDLC SERPL CALC-MCNC: 113 MG/DL
LEUKOCYTE ESTERASE URINE: NEGATIVE
LYMPHOCYTES # BLD AUTO: 2.15 K/UL
LYMPHOCYTES NFR BLD AUTO: 30.2 %
MAN DIFF?: NORMAL
MCHC RBC-ENTMCNC: 28.1 PG
MCHC RBC-ENTMCNC: 32.2 GM/DL
MCV RBC AUTO: 87.1 FL
MICROSCOPIC-UA: NORMAL
MONOCYTES # BLD AUTO: 0.59 K/UL
MONOCYTES NFR BLD AUTO: 8.3 %
NEUTROPHILS # BLD AUTO: 4.14 K/UL
NEUTROPHILS NFR BLD AUTO: 58.1 %
NITRITE URINE: NEGATIVE
NONHDLC SERPL-MCNC: 141 MG/DL
PH URINE: 7
PLATELET # BLD AUTO: 251 K/UL
POTASSIUM SERPL-SCNC: 4.5 MMOL/L
PROT SERPL-MCNC: 6.6 G/DL
PROTEIN URINE: NEGATIVE
RBC # BLD: 4.81 M/UL
RBC # FLD: 14.6 %
RED BLOOD CELLS URINE: 0 /HPF
SAVE SPECIMEN: NORMAL
SODIUM SERPL-SCNC: 138 MMOL/L
SPECIFIC GRAVITY URINE: 1.01
SQUAMOUS EPITHELIAL CELLS: 0 /HPF
T3RU NFR SERPL: 1.1 TBI
T4 SERPL-MCNC: 5.7 UG/DL
TRIGL SERPL-MCNC: 137 MG/DL
TSH SERPL-ACNC: 1.82 UIU/ML
URATE SERPL-MCNC: 3.9 MG/DL
UROBILINOGEN URINE: NORMAL
VIT B12 SERPL-MCNC: 879 PG/ML
WBC # FLD AUTO: 7.12 K/UL
WHITE BLOOD CELLS URINE: 1 /HPF

## 2020-11-17 PROCEDURE — G0439: CPT

## 2020-11-17 PROCEDURE — 99213 OFFICE O/P EST LOW 20 MIN: CPT | Mod: 25

## 2020-11-17 PROCEDURE — 36415 COLL VENOUS BLD VENIPUNCTURE: CPT

## 2020-11-17 PROCEDURE — 93000 ELECTROCARDIOGRAM COMPLETE: CPT

## 2020-11-17 RX ORDER — RALOXIFENE HYDROCHLORIDE 60 MG/1
60 TABLET, FILM COATED ORAL
Qty: 30 | Refills: 3 | Status: DISCONTINUED | COMMUNITY
Start: 2017-11-02 | End: 2020-11-17

## 2020-11-17 RX ORDER — CYCLOBENZAPRINE HYDROCHLORIDE 10 MG/1
10 TABLET, FILM COATED ORAL
Qty: 30 | Refills: 1 | Status: DISCONTINUED | COMMUNITY
Start: 2018-08-20 | End: 2020-11-17

## 2020-11-17 NOTE — HEALTH RISK ASSESSMENT
[No] : No [Never (0 pts)] : Never (0 points) [No falls in past year] : Patient reported no falls in the past year [0] : 2) Feeling down, depressed, or hopeless: Not at all (0) [[id number]] : 0 [Fully functional (bathing, dressing, toileting, transferring, walking, feeding)] : Fully functional (bathing, dressing, toileting, transferring, walking, feeding) [Fully functional (using the telephone, shopping, preparing meals, housekeeping, doing laundry, using] : Fully functional and needs no help or supervision to perform IADLs (using the telephone, shopping, preparing meals, housekeeping, doing laundry, using transportation, managing medications and managing finances) [With Patient/Caregiver] : With Patient/Caregiver [I will adhere to the patient's wishes as expressed in the advance directive except as noted below.] : I will adhere to the patient's wishes as expressed in the advance directive except as noted below [AdvancecareDate] : 11/17/2020

## 2020-11-17 NOTE — ASSESSMENT
[FreeTextEntry1] : Problems\par Hemangioma of the brain\par Cecal volvulus\par Hypercholesterolemia\par Obstructive sleep apnea\par Assessment\par The patient's vital signs were stable her physical examination was unchanged. I referred the patient for an MRI of the brain with contrast and in addition refer her to cardiology for an echocardiogram\par

## 2020-11-17 NOTE — HISTORY OF PRESENT ILLNESS
[de-identified] : This is a 70-year-old patient with a history of progressive angioma the brain, cecal volvulus, hypercholesterolemia, sleep apnea who is here today for her annual examination

## 2020-12-08 ENCOUNTER — APPOINTMENT (OUTPATIENT)
Dept: MRI IMAGING | Facility: IMAGING CENTER | Age: 70
End: 2020-12-08
Payer: MEDICARE

## 2020-12-08 ENCOUNTER — OUTPATIENT (OUTPATIENT)
Dept: OUTPATIENT SERVICES | Facility: HOSPITAL | Age: 70
LOS: 1 days | End: 2020-12-08
Payer: MEDICARE

## 2020-12-08 DIAGNOSIS — D18.02 HEMANGIOMA OF INTRACRANIAL STRUCTURES: ICD-10-CM

## 2020-12-08 PROCEDURE — A9585: CPT

## 2020-12-08 PROCEDURE — 70553 MRI BRAIN STEM W/O & W/DYE: CPT | Mod: 26

## 2020-12-08 PROCEDURE — 70553 MRI BRAIN STEM W/O & W/DYE: CPT

## 2020-12-09 ENCOUNTER — APPOINTMENT (OUTPATIENT)
Dept: CARDIOLOGY | Facility: CLINIC | Age: 70
End: 2020-12-09
Payer: MEDICARE

## 2020-12-09 ENCOUNTER — NON-APPOINTMENT (OUTPATIENT)
Age: 70
End: 2020-12-09

## 2020-12-09 ENCOUNTER — APPOINTMENT (OUTPATIENT)
Dept: CARDIOLOGY | Facility: CLINIC | Age: 70
End: 2020-12-09

## 2020-12-09 PROCEDURE — 93306 TTE W/DOPPLER COMPLETE: CPT

## 2021-01-05 ENCOUNTER — APPOINTMENT (OUTPATIENT)
Dept: INTERNAL MEDICINE | Facility: CLINIC | Age: 71
End: 2021-01-05
Payer: MEDICARE

## 2021-01-05 VITALS
WEIGHT: 123 LBS | TEMPERATURE: 97.5 F | HEART RATE: 70 BPM | OXYGEN SATURATION: 97 % | HEIGHT: 60 IN | BODY MASS INDEX: 24.15 KG/M2

## 2021-01-05 VITALS — SYSTOLIC BLOOD PRESSURE: 130 MMHG | DIASTOLIC BLOOD PRESSURE: 70 MMHG

## 2021-01-05 PROCEDURE — 99214 OFFICE O/P EST MOD 30 MIN: CPT

## 2021-01-05 NOTE — PHYSICAL EXAM
[Normal] : soft, non-tender, non-distended, no masses palpated, no HSM and normal bowel sounds [de-identified] : Glossitis

## 2021-01-05 NOTE — HISTORY OF PRESENT ILLNESS
[de-identified] : This is a 70-year-old patient with a history of cavernous hemangioma of the brain, glossitis severe, obstructive sleep apnea.

## 2021-01-05 NOTE — ASSESSMENT
[FreeTextEntry1] : Problems\par Cavernous hemangioma of the brain\par Severe inflammatory glossitis\par Obstructive sleep apnea\par Exposure to Covid\par Assessment\par This is a patient who is here today because of severe glossitis that she has had now for a month.  Recent review of the blood tests reveal a normal vitamin B12 level, normal folic acid level, normal thyroid levels and normal B complex levels.  My impression is that the glossitis is secondary to her serotonin reuptake inhibitors.  I recommended that the patient decrease her Zoloft to 12-1/2 mg daily for 10 days and then 12-1/2 mg every other day for 10 days and then stop.  I informed her that old SSRIs can cause glossitis and that if she started to get depressed or graves I would start her on Wellbutrin.

## 2021-01-06 ENCOUNTER — APPOINTMENT (OUTPATIENT)
Dept: INTERNAL MEDICINE | Facility: CLINIC | Age: 71
End: 2021-01-06

## 2021-01-08 LAB — BACTERIA THROAT CULT: NORMAL

## 2021-02-27 LAB
ALBUMIN SERPL ELPH-MCNC: 4.2 G/DL
ALP BLD-CCNC: 70 U/L
ALT SERPL-CCNC: 24 U/L
AST SERPL-CCNC: 17 U/L
BILIRUB DIRECT SERPL-MCNC: 0.1 MG/DL
BILIRUB INDIRECT SERPL-MCNC: 0.3 MG/DL
BILIRUB SERPL-MCNC: 0.4 MG/DL
CHOLEST SERPL-MCNC: 164 MG/DL
HDLC SERPL-MCNC: 33 MG/DL
LDLC SERPL CALC-MCNC: 105 MG/DL
NONHDLC SERPL-MCNC: 131 MG/DL
PROT SERPL-MCNC: 6.7 G/DL
SAVE SPECIMEN: NORMAL
TRIGL SERPL-MCNC: 130 MG/DL

## 2021-06-14 ENCOUNTER — APPOINTMENT (OUTPATIENT)
Dept: OBGYN | Facility: CLINIC | Age: 71
End: 2021-06-14

## 2021-06-22 ENCOUNTER — APPOINTMENT (OUTPATIENT)
Dept: OBGYN | Facility: CLINIC | Age: 71
End: 2021-06-22

## 2021-06-29 ENCOUNTER — RX RENEWAL (OUTPATIENT)
Age: 71
End: 2021-06-29

## 2021-07-01 ENCOUNTER — APPOINTMENT (OUTPATIENT)
Dept: OBGYN | Facility: CLINIC | Age: 71
End: 2021-07-01
Payer: MEDICARE

## 2021-07-01 DIAGNOSIS — N83.201 UNSPECIFIED OVARIAN CYST, RIGHT SIDE: ICD-10-CM

## 2021-07-01 DIAGNOSIS — D25.9 LEIOMYOMA OF UTERUS, UNSPECIFIED: ICD-10-CM

## 2021-07-01 PROCEDURE — 76856 US EXAM PELVIC COMPLETE: CPT | Mod: 59

## 2021-07-01 PROCEDURE — 93976 VASCULAR STUDY: CPT | Mod: 59

## 2021-07-01 PROCEDURE — 76830 TRANSVAGINAL US NON-OB: CPT

## 2021-07-01 PROCEDURE — 99213 OFFICE O/P EST LOW 20 MIN: CPT

## 2021-07-01 NOTE — PROCEDURE
[Transvaginal Ultrasound] : transvaginal ultrasound [Color Doppler Imaging] : color doppler imaging [FreeTextEntry9] : SEE REPORT [de-identified] : TRANSABDOMINAL ULTRASOUND \par  [FreeTextEntry3] : SEE PELVIC SONO REPORT

## 2021-08-04 ENCOUNTER — TRANSCRIPTION ENCOUNTER (OUTPATIENT)
Age: 71
End: 2021-08-04

## 2021-08-16 ENCOUNTER — APPOINTMENT (OUTPATIENT)
Dept: ORTHOPEDIC SURGERY | Facility: CLINIC | Age: 71
End: 2021-08-16

## 2021-08-31 ENCOUNTER — NON-APPOINTMENT (OUTPATIENT)
Age: 71
End: 2021-08-31

## 2021-09-22 NOTE — HISTORY OF PRESENT ILLNESS
Sent Dad a My Chart message re: the plan for JJ.   [de-identified] : This is a patient with a history of hypercholesterolemia, mood disorder, heart murmur who is here because for a followup on her cholesterol and also because she's complaining of increasing snoring when she sleeps.

## 2021-10-25 ENCOUNTER — TRANSCRIPTION ENCOUNTER (OUTPATIENT)
Age: 71
End: 2021-10-25

## 2021-11-03 ENCOUNTER — LABORATORY RESULT (OUTPATIENT)
Age: 71
End: 2021-11-03

## 2021-11-04 LAB
25(OH)D3 SERPL-MCNC: 54.1 NG/ML
ALBUMIN SERPL ELPH-MCNC: 4.2 G/DL
ALP BLD-CCNC: 74 U/L
ALT SERPL-CCNC: 20 U/L
ANION GAP SERPL CALC-SCNC: 16 MMOL/L
APPEARANCE: CLEAR
AST SERPL-CCNC: 19 U/L
BACTERIA: NEGATIVE
BASOPHILS # BLD AUTO: 0.03 K/UL
BASOPHILS NFR BLD AUTO: 0.5 %
BILIRUB SERPL-MCNC: 0.2 MG/DL
BILIRUBIN URINE: NEGATIVE
BLOOD URINE: NEGATIVE
BUN SERPL-MCNC: 14 MG/DL
CALCIUM SERPL-MCNC: 9.8 MG/DL
CHLORIDE SERPL-SCNC: 104 MMOL/L
CHOLEST SERPL-MCNC: 184 MG/DL
CO2 SERPL-SCNC: 25 MMOL/L
COLOR: NORMAL
CREAT SERPL-MCNC: 0.66 MG/DL
EOSINOPHIL # BLD AUTO: 0.27 K/UL
EOSINOPHIL NFR BLD AUTO: 4.3 %
ESTIMATED AVERAGE GLUCOSE: 100 MG/DL
FERRITIN SERPL-MCNC: 72 NG/ML
FOLATE SERPL-MCNC: >20 NG/ML
GLUCOSE QUALITATIVE U: NEGATIVE
GLUCOSE SERPL-MCNC: 90 MG/DL
HBA1C MFR BLD HPLC: 5.1 %
HCT VFR BLD CALC: 44.2 %
HDLC SERPL-MCNC: 34 MG/DL
HGB BLD-MCNC: 14 G/DL
HYALINE CASTS: 1 /LPF
IMM GRANULOCYTES NFR BLD AUTO: 0.3 %
KETONES URINE: NEGATIVE
LDLC SERPL CALC-MCNC: 120 MG/DL
LEUKOCYTE ESTERASE URINE: NEGATIVE
LYMPHOCYTES # BLD AUTO: 1.95 K/UL
LYMPHOCYTES NFR BLD AUTO: 31.4 %
MAN DIFF?: NORMAL
MCHC RBC-ENTMCNC: 29.1 PG
MCHC RBC-ENTMCNC: 31.7 GM/DL
MCV RBC AUTO: 91.9 FL
MICROSCOPIC-UA: NORMAL
MONOCYTES # BLD AUTO: 0.55 K/UL
MONOCYTES NFR BLD AUTO: 8.9 %
NEUTROPHILS # BLD AUTO: 3.39 K/UL
NEUTROPHILS NFR BLD AUTO: 54.6 %
NITRITE URINE: NEGATIVE
NONHDLC SERPL-MCNC: 150 MG/DL
PH URINE: 6
PLATELET # BLD AUTO: 240 K/UL
POTASSIUM SERPL-SCNC: 5.1 MMOL/L
PROT SERPL-MCNC: 6.7 G/DL
PROTEIN URINE: NEGATIVE
RBC # BLD: 4.81 M/UL
RBC # FLD: 14.2 %
RED BLOOD CELLS URINE: 0 /HPF
SODIUM SERPL-SCNC: 145 MMOL/L
SPECIFIC GRAVITY URINE: 1.01
SQUAMOUS EPITHELIAL CELLS: 0 /HPF
T3RU NFR SERPL: 1.1 TBI
T4 SERPL-MCNC: 5.9 UG/DL
TRIGL SERPL-MCNC: 150 MG/DL
TSH SERPL-ACNC: 2.33 UIU/ML
URATE SERPL-MCNC: 4.4 MG/DL
UROBILINOGEN URINE: NORMAL
VIT B12 SERPL-MCNC: >2000 PG/ML
WBC # FLD AUTO: 6.21 K/UL
WHITE BLOOD CELLS URINE: 1 /HPF

## 2021-11-18 ENCOUNTER — NON-APPOINTMENT (OUTPATIENT)
Age: 71
End: 2021-11-18

## 2021-11-18 ENCOUNTER — APPOINTMENT (OUTPATIENT)
Dept: INTERNAL MEDICINE | Facility: CLINIC | Age: 71
End: 2021-11-18
Payer: MEDICARE

## 2021-11-18 VITALS — WEIGHT: 128 LBS | HEIGHT: 59.45 IN | BODY MASS INDEX: 25.46 KG/M2

## 2021-11-18 DIAGNOSIS — R00.0 TACHYCARDIA, UNSPECIFIED: ICD-10-CM

## 2021-11-18 DIAGNOSIS — Z87.19 PERSONAL HISTORY OF OTHER DISEASES OF THE DIGESTIVE SYSTEM: ICD-10-CM

## 2021-11-18 DIAGNOSIS — G47.33 OBSTRUCTIVE SLEEP APNEA (ADULT) (PEDIATRIC): ICD-10-CM

## 2021-11-18 DIAGNOSIS — Z90.49 ACQUIRED ABSENCE OF OTHER SPECIFIED PARTS OF DIGESTIVE TRACT: ICD-10-CM

## 2021-11-18 DIAGNOSIS — Z86.018 PERSONAL HISTORY OF OTHER BENIGN NEOPLASM: ICD-10-CM

## 2021-11-18 DIAGNOSIS — Z87.39 PERSONAL HISTORY OF OTHER DISEASES OF THE MUSCULOSKELETAL SYSTEM AND CONNECTIVE TISSUE: ICD-10-CM

## 2021-11-18 PROCEDURE — G0439: CPT

## 2021-11-18 PROCEDURE — 99213 OFFICE O/P EST LOW 20 MIN: CPT | Mod: 25

## 2021-11-18 PROCEDURE — 93000 ELECTROCARDIOGRAM COMPLETE: CPT | Mod: 59

## 2021-11-18 NOTE — ASSESSMENT
[FreeTextEntry1] : Problems\par Hemangioma of the brain\par Cecal volvulus\par Hypercholesterolemia\par Obstructive sleep apnea\par Assessment\par The patient's vital signs were stable her physical examination was unchanged.  Her LDL is 120 I increased her Crestor to 20 mg daily and advised that she repeat her bloods in 6 weeks\par

## 2021-11-18 NOTE — HEALTH RISK ASSESSMENT
[No] : No [Never (0 pts)] : Never (0 points) [No falls in past year] : Patient reported no falls in the past year [0] : 2) Feeling down, depressed, or hopeless: Not at all (0) [PHQ-2 Negative - No further assessment needed] : PHQ-2 Negative - No further assessment needed [[id number]] : 0 [Fully functional (bathing, dressing, toileting, transferring, walking, feeding)] : Fully functional (bathing, dressing, toileting, transferring, walking, feeding) [Fully functional (using the telephone, shopping, preparing meals, housekeeping, doing laundry, using] : Fully functional and needs no help or supervision to perform IADLs (using the telephone, shopping, preparing meals, housekeeping, doing laundry, using transportation, managing medications and managing finances)

## 2021-11-18 NOTE — HISTORY OF PRESENT ILLNESS
[de-identified] : This is a 70-year-old patient with a history of progressive angioma the brain, cecal volvulus, hypercholesterolemia, sleep apnea who is here today for her annual examination

## 2021-12-02 ENCOUNTER — APPOINTMENT (OUTPATIENT)
Dept: CARDIOLOGY | Facility: CLINIC | Age: 71
End: 2021-12-02
Payer: MEDICARE

## 2021-12-02 VITALS
WEIGHT: 130 LBS | OXYGEN SATURATION: 98 % | BODY MASS INDEX: 25.52 KG/M2 | TEMPERATURE: 96.9 F | HEIGHT: 60 IN | HEART RATE: 72 BPM | SYSTOLIC BLOOD PRESSURE: 98 MMHG | DIASTOLIC BLOOD PRESSURE: 54 MMHG

## 2021-12-02 PROCEDURE — 99204 OFFICE O/P NEW MOD 45 MIN: CPT

## 2021-12-02 NOTE — CARDIOLOGY SUMMARY
[de-identified] : 11/18/2021, NSR with LAE and poor R wave progression. [de-identified] : 12/9/2020, normal LV systolic function, mild AI, trace MR, trace TR with estimated PASP of 24mmHg.

## 2021-12-02 NOTE — PHYSICAL EXAM
[Normal S1, S2] : normal S1, S2 [Normal] : moves all extremities, no focal deficits, normal speech [de-identified] : No carotid bruits auscultated bilaterally [de-identified] : 2/6 systolic ejection murmur Admission Reconciliation is Completed  Discharge Reconciliation is Completed

## 2021-12-02 NOTE — REVIEW OF SYSTEMS
[Joint Pain] : joint pain [Joint Stiffness] : joint stiffness [Negative] : Neurological [FreeTextEntry5] : see HPI

## 2021-12-02 NOTE — DISCUSSION/SUMMARY
[FreeTextEntry1] : 1. Chest pain: never had ETT done before. Patient with knee pain but does [employer] classes and would like to try ETT. Understands that if she can't reach target HR, a pharmacologic nuclear stress test would be recommended. \par \par 2. AI: mild on echocardiogram from December 2020. Recommend periodic echo surveillance. \par \par 3. HLD: continue rosuvastatin 10mg daily\par \par Office will call with results. \par \par Follow up in 1 year.

## 2021-12-02 NOTE — HISTORY OF PRESENT ILLNESS
[FreeTextEntry1] : 71 year old female with history of HLD, mild aortic valve insufficiency presents for a cardiac evaluation. Patient suffers from anxiety at times and can get a discomfort in her chest when she is anxious. No associated SOB or palpitations. \par \par There is no history of MI, CVA, CHF, or previous coronary intervention.\par

## 2021-12-09 ENCOUNTER — APPOINTMENT (OUTPATIENT)
Dept: OBGYN | Facility: CLINIC | Age: 71
End: 2021-12-09
Payer: MEDICARE

## 2021-12-09 VITALS
HEIGHT: 60 IN | BODY MASS INDEX: 25.13 KG/M2 | WEIGHT: 128 LBS | SYSTOLIC BLOOD PRESSURE: 122 MMHG | DIASTOLIC BLOOD PRESSURE: 80 MMHG

## 2021-12-09 DIAGNOSIS — K44.9 DIAPHRAGMATIC HERNIA W/OUT OBSTRUCTION OR GANGRENE: ICD-10-CM

## 2021-12-09 DIAGNOSIS — K56.2 VOLVULUS: ICD-10-CM

## 2021-12-09 PROCEDURE — G0101: CPT

## 2021-12-09 PROCEDURE — 99212 OFFICE O/P EST SF 10 MIN: CPT | Mod: 25

## 2021-12-09 NOTE — PROCEDURE
[Cervical Pap Smear] : cervical Pap smear [Liquid Base] : liquid base [Tolerated Well] : the patient tolerated the procedure well [No Complications] : there were no complications [Transvaginal Ultrasound] : transvaginal ultrasound [FreeTextEntry3] : TVUS: RV 4.9 cm uterus, ee 1 mm, 1 cm endometrial polyp, l ov 1.2 cm, r ov unable to see due to bowel

## 2021-12-09 NOTE — HISTORY OF PRESENT ILLNESS
[FreeTextEntry1] : JOMAR MTZ is a 71 year F  LMP 51 yrs old  here for annual visit but also for surveillance of her endometrial polyp. Patient transferring her care to me, she was seeing Dr. Worthington who was doing annual sonogram on her and it was discovered she had a polyp a few years ago. She never had bleeding, no pain. The polyp was never confirmed by biopsy. Patient is not currently sexually active. \par \par Gynhx: h/o Reg menses, No h/o STIs/fibroids/cysts; pt has a very remote h/o abn paps\par Obhx: c/s x3\par \par Last mammo: last mammo/sono 2021\par Last Colonoscopy: 2019 - normal per patient - she said she was told she didn’t need to have anymore, shes gets endoscopies every few years for GERD\par Last Pap smear: 2018 - nilm, hpv neg\par Last dexa: 2020 - osteopenia\par

## 2021-12-09 NOTE — PHYSICAL EXAM
[Chaperone Present] : A chaperone was present in the examining room during all aspects of the physical examination [Appropriately responsive] : appropriately responsive [Alert] : alert [No Acute Distress] : no acute distress [No Lymphadenopathy] : no lymphadenopathy [Regular Rate Rhythm] : regular rate rhythm [Soft] : soft [Non-tender] : non-tender [Non-distended] : non-distended [No HSM] : No HSM [No Lesions] : no lesions [No Mass] : no mass [Oriented x3] : oriented x3 [Examination Of The Breasts] : a normal appearance [No Masses] : no breast masses were palpable [Labia Majora] : normal [Labia Minora] : normal [Normal] : normal [Uterine Adnexae] : normal

## 2021-12-09 NOTE — DISCUSSION/SUMMARY
[FreeTextEntry1] : 71 year yo PMF here for wwe, endometrial polyp, nl exam\par - pap/hpv sent\par - sbe reveiwed, Mammo UTD\par - Dexa RX UTD - next 2022\par - colonoscopy UTD\par - for endo bx, cytotec prior\par - postmenopausal bleeding precautions given\par

## 2021-12-10 LAB — HPV HIGH+LOW RISK DNA PNL CVX: NOT DETECTED

## 2021-12-15 LAB — CYTOLOGY CVX/VAG DOC THIN PREP: ABNORMAL

## 2021-12-30 ENCOUNTER — OUTPATIENT (OUTPATIENT)
Dept: OUTPATIENT SERVICES | Facility: HOSPITAL | Age: 71
LOS: 1 days | End: 2021-12-30

## 2022-01-03 ENCOUNTER — APPOINTMENT (OUTPATIENT)
Dept: CARDIOLOGY | Facility: CLINIC | Age: 72
End: 2022-01-03

## 2022-01-07 ENCOUNTER — APPOINTMENT (OUTPATIENT)
Dept: CARDIOLOGY | Facility: CLINIC | Age: 72
End: 2022-01-07
Payer: MEDICARE

## 2022-01-07 PROCEDURE — 93306 TTE W/DOPPLER COMPLETE: CPT

## 2022-01-07 PROCEDURE — 93015 CV STRESS TEST SUPVJ I&R: CPT

## 2022-01-07 PROCEDURE — 78452 HT MUSCLE IMAGE SPECT MULT: CPT

## 2022-01-07 PROCEDURE — A9502: CPT

## 2022-01-11 ENCOUNTER — NON-APPOINTMENT (OUTPATIENT)
Age: 72
End: 2022-01-11

## 2022-01-11 ENCOUNTER — APPOINTMENT (OUTPATIENT)
Dept: FAMILY MEDICINE | Facility: CLINIC | Age: 72
End: 2022-01-11
Payer: MEDICARE

## 2022-01-11 VITALS
HEIGHT: 60 IN | SYSTOLIC BLOOD PRESSURE: 114 MMHG | DIASTOLIC BLOOD PRESSURE: 60 MMHG | RESPIRATION RATE: 15 BRPM | OXYGEN SATURATION: 96 % | BODY MASS INDEX: 25.39 KG/M2 | TEMPERATURE: 96.7 F | HEART RATE: 52 BPM | WEIGHT: 129.3 LBS

## 2022-01-11 DIAGNOSIS — Z11.59 ENCOUNTER FOR SCREENING FOR OTHER VIRAL DISEASES: ICD-10-CM

## 2022-01-11 DIAGNOSIS — Z20.822 CONTACT WITH AND (SUSPECTED) EXPOSURE TO COVID-19: ICD-10-CM

## 2022-01-11 PROCEDURE — G0439: CPT

## 2022-01-11 RX ORDER — GUAR GUM 1 G
TABLET,CHEWABLE ORAL
Refills: 0 | Status: COMPLETED | COMMUNITY

## 2022-01-11 RX ORDER — ASCORBIC ACID 125 MG
TABLET,CHEWABLE ORAL
Refills: 0 | Status: COMPLETED | COMMUNITY

## 2022-01-11 RX ORDER — ASCORBIC ACID,SOD/ZINC GLUC,OX 120MG-20MG
LOZENGE ORAL
Refills: 0 | Status: COMPLETED | COMMUNITY

## 2022-01-11 NOTE — HEALTH RISK ASSESSMENT
[Very Good] : ~his/her~ current health as very good [Good] : ~his/her~  mood as  good [Never] : Never [Yes] : Yes [2 - 4 times a month (2 pts)] : 2-4 times a month (2 points) [1 or 2 (0 pts)] : 1 or 2 (0 points) [Never (0 pts)] : Never (0 points) [No] : In the past 12 months have you used drugs other than those required for medical reasons? No [No falls in past year] : Patient reported no falls in the past year [0] : 2) Feeling down, depressed, or hopeless: Not at all (0) [FreeTextEntry1] : Establish care and address health concerns. [de-identified] : cardiology, [facility] ([place]) [Audit-CScore] : 2 [[id number]] : 0

## 2022-01-11 NOTE — HEALTH RISK ASSESSMENT
[Very Good] : ~his/her~ current health as very good [Good] : ~his/her~  mood as  good [Never] : Never [Yes] : Yes [2 - 4 times a month (2 pts)] : 2-4 times a month (2 points) [1 or 2 (0 pts)] : 1 or 2 (0 points) [Never (0 pts)] : Never (0 points) [No] : In the past 12 months have you used drugs other than those required for medical reasons? No [No falls in past year] : Patient reported no falls in the past year [0] : 2) Feeling down, depressed, or hopeless: Not at all (0) [FreeTextEntry1] : Establish care and address health concerns. [de-identified] : cardiology, [facility] ([place]) [Audit-CScore] : 2 [[id number]] : 0

## 2022-01-12 NOTE — ASSESSMENT
[FreeTextEntry1] : New Patient  encounter for  71  year old WF  with PMH as stated in HPI / active list. \par \par Management : \par \par HM UTD. \par Vaccinations UTD\par \par Recent Labs reviewed and noted to be unremarkable except  otherwise noted \par \par See HPI and Plan\par \par Best wishes offered !\par \par \par

## 2022-01-12 NOTE — HISTORY OF PRESENT ILLNESS
[FreeTextEntry1] : to establish care  \par Annual exam was Nov. 2021 and reviewed. \par \par Cardiology reviewed and appreciated for ALL WNL  Started on Rosuvastatin for   [de-identified] : Ms. JOMAR MTZ presents today to establish care being referred to me by her own research \par Recent full-time move from [place] to Home/ CODO  in [facility]; "very Happy"\par \par She is an affable 71 year old female with PMH significant for mild aortic valve insufficiency and HLD ,  GERD , OA and  anxiety ( especially as it relates to her health)  incidental cavernous hemangioma ( stable; MRI of Brain 12/20) ) , BPV and EM polyp( anticipating bx next week) \par \par PSH significant for emergent  surgery , Dr Tovar at [facility] in  July for 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 \par \par Denies any recent MVA's or MSK injuries. \par \par Providers:  GI  Dr. Gunnar Roblero\par                    GYN  Dr. Jorge L Ashley \par                    Psych [facility] in [place];  Therapist Nasreen  ; also engages in Mindfulness Group. \par                    Ortho  Dr. Jaden dooley encounter next week \par \par  HM:   Colonoscopy  2019 FU 10 years \par           Mammo Aug. 2021  [facility] [address] \par \par Social:   [place]; ( healthy) l \par             3 adult children daughter  in [place] ; son in  [place]  and one in [place] (). \par             7 GC; \par             Retired ; [employer]  6 th grade \par             walks most days and [employer] classes  on   [employer] \par \par Family trip planned to celebrate 50 years wedding anniversary in February to [place].\par               \par \par

## 2022-01-12 NOTE — HISTORY OF PRESENT ILLNESS
[FreeTextEntry1] : to establish care  \par Annual exam was Nov. 2021 and reviewed. \par \par Cardiology reviewed and appreciated for ALL WNL  Started on Rosuvastatin for   [de-identified] : Ms. JOMAR MTZ presents today to establish care being referred to me by her own research \par Recent full-time move from [place] to Home/ CODO  in [facility]; "very Happy"\par \par She is an affable 71 year old female with PMH significant for mild aortic valve insufficiency and HLD ,  GERD , OA and  anxiety ( especially as it relates to her health)  incidental cavernous hemangioma ( stable; MRI of Brain 12/20) ) , BPV and EM polyp( anticipating bx next week) \par \par PSH significant for emergent  surgery , Dr Tovar at [facility] in  July for 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 \par \par Denies any recent MVA's or MSK injuries. \par \par Providers:  GI  Dr. Gunnar Roblero\par                    GYN  Dr. Jorge L Ashley \par                    Psych [facility] in [place];  Therapist Nasreen  ; also engages in Mindfulness Group. \par                    Ortho  Dr. Jaden dooley encounter next week \par \par  HM:   Colonoscopy  2019 FU 10 years \par           Mammo Aug. 2021  [facility] [address] 1 \par \par Social:   [place]; ( healthy) l \par             3 adult children daughter  in [place] ; son in  [place]  and one in [place] (). \par             7 GC; \par             Retired ; [employer]  6 th grade \par             walks most days and [employer] classes  on   [employer] \par \par Family trip planned to celebrate 50 years wedding anniversary in February to [place].\par               \par \par

## 2022-01-12 NOTE — PHYSICAL EXAM
[No Acute Distress] : no acute distress [Normal Sclera/Conjunctiva] : normal sclera/conjunctiva [No JVD] : no jugular venous distention [No Respiratory Distress] : no respiratory distress  [No Accessory Muscle Use] : no accessory muscle use [Clear to Auscultation] : lungs were clear to auscultation bilaterally [Regular Rhythm] : with a regular rhythm [Normal S1, S2] : normal S1 and S2 [No Edema] : there was no peripheral edema [Normal Supraclavicular Nodes] : no supraclavicular lymphadenopathy [No Spinal Tenderness] : no spinal tenderness [No Joint Swelling] : no joint swelling [No Rash] : no rash [No Focal Deficits] : no focal deficits [Speech Grossly Normal] : speech grossly normal [Alert and Oriented x3] : oriented to person, place, and time [de-identified] : calm and engaging  [de-identified] : KIM [de-identified] : no trmors  [de-identified] : warm and dry

## 2022-01-12 NOTE — PHYSICAL EXAM
[No Acute Distress] : no acute distress [Normal Sclera/Conjunctiva] : normal sclera/conjunctiva [No JVD] : no jugular venous distention [No Respiratory Distress] : no respiratory distress  [No Accessory Muscle Use] : no accessory muscle use [Clear to Auscultation] : lungs were clear to auscultation bilaterally [Regular Rhythm] : with a regular rhythm [Normal S1, S2] : normal S1 and S2 [No Edema] : there was no peripheral edema [Normal Supraclavicular Nodes] : no supraclavicular lymphadenopathy [No Spinal Tenderness] : no spinal tenderness [No Joint Swelling] : no joint swelling [No Rash] : no rash [No Focal Deficits] : no focal deficits [Speech Grossly Normal] : speech grossly normal [Alert and Oriented x3] : oriented to person, place, and time [de-identified] : calm and engaging  [de-identified] : KIM [de-identified] : no trmors  [de-identified] : warm and dry

## 2022-01-18 ENCOUNTER — APPOINTMENT (OUTPATIENT)
Dept: OBGYN | Facility: CLINIC | Age: 72
End: 2022-01-18
Payer: MEDICARE

## 2022-01-18 ENCOUNTER — TRANSCRIPTION ENCOUNTER (OUTPATIENT)
Age: 72
End: 2022-01-18

## 2022-01-18 VITALS
SYSTOLIC BLOOD PRESSURE: 102 MMHG | HEIGHT: 60 IN | DIASTOLIC BLOOD PRESSURE: 68 MMHG | WEIGHT: 128 LBS | BODY MASS INDEX: 25.13 KG/M2

## 2022-01-18 DIAGNOSIS — N88.2 STRICTURE AND STENOSIS OF CERVIX UTERI: ICD-10-CM

## 2022-01-18 PROCEDURE — 99212 OFFICE O/P EST SF 10 MIN: CPT

## 2022-01-18 NOTE — PHYSICAL EXAM
[Labia Majora] : normal [Labia Minora] : normal [Normal] : normal [Cervical Stenosis] : cervical stenosis

## 2022-01-18 NOTE — DISCUSSION/SUMMARY
[FreeTextEntry1] : suspected endometrial polyp, cervical stenosis, failed endometrial biopsy\par - offerred repeat with better cyttoec placement or d&C hystersocpy myosure in hospital\par - patient prefers hospital procedure - will brayan\par - spoke protein ab sent (pt requests)

## 2022-01-18 NOTE — HISTORY OF PRESENT ILLNESS
[FreeTextEntry1] : 71 yr old here for endometrial biopsy for suspected endeomtrial polyp. Patient took cyttoec but not placed correctly

## 2022-01-19 LAB
COVID-19 SPIKE DOMAIN ANTIBODY INTERPRETATION: POSITIVE
SARS-COV-2 AB SERPL IA-ACNC: 145 U/ML

## 2022-03-11 ENCOUNTER — NON-APPOINTMENT (OUTPATIENT)
Age: 72
End: 2022-03-11
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
## 2022-04-06 ENCOUNTER — TRANSCRIPTION ENCOUNTER (OUTPATIENT)
Age: 72
End: 2022-04-06

## 2022-04-11 PROBLEM — Z11.59 SCREENING FOR VIRAL DISEASE: Status: RESOLVED | Noted: 2021-10-25 | Resolved: 2022-01-11

## 2022-04-12 ENCOUNTER — OUTPATIENT (OUTPATIENT)
Dept: OUTPATIENT SERVICES | Facility: HOSPITAL | Age: 72
LOS: 1 days | End: 2022-04-12

## 2022-04-13 DIAGNOSIS — N84.0 POLYP OF CORPUS UTERI: ICD-10-CM

## 2022-04-13 DIAGNOSIS — Z01.812 ENCOUNTER FOR PREPROCEDURAL LABORATORY EXAMINATION: ICD-10-CM

## 2022-04-15 ENCOUNTER — APPOINTMENT (OUTPATIENT)
Dept: FAMILY MEDICINE | Facility: CLINIC | Age: 72
End: 2022-04-15
Payer: MEDICARE

## 2022-04-15 VITALS
DIASTOLIC BLOOD PRESSURE: 64 MMHG | HEIGHT: 60 IN | RESPIRATION RATE: 16 BRPM | WEIGHT: 131 LBS | HEART RATE: 69 BPM | BODY MASS INDEX: 25.72 KG/M2 | TEMPERATURE: 98.6 F | SYSTOLIC BLOOD PRESSURE: 112 MMHG | OXYGEN SATURATION: 98 %

## 2022-04-15 DIAGNOSIS — D18.02 HEMANGIOMA OF INTRACRANIAL STRUCTURES: ICD-10-CM

## 2022-04-15 DIAGNOSIS — Z01.818 ENCOUNTER FOR OTHER PREPROCEDURAL EXAMINATION: ICD-10-CM

## 2022-04-15 PROCEDURE — 99214 OFFICE O/P EST MOD 30 MIN: CPT

## 2022-04-15 RX ORDER — ALPRAZOLAM 0.25 MG/1
0.25 TABLET ORAL TWICE DAILY
Qty: 60 | Refills: 0 | Status: DISCONTINUED | COMMUNITY
Start: 2017-11-02 | End: 2022-04-15

## 2022-04-22 ENCOUNTER — NON-APPOINTMENT (OUTPATIENT)
Age: 72
End: 2022-04-22

## 2022-04-22 ENCOUNTER — APPOINTMENT (OUTPATIENT)
Dept: CARDIOLOGY | Facility: CLINIC | Age: 72
End: 2022-04-22
Payer: MEDICARE

## 2022-04-22 VITALS
DIASTOLIC BLOOD PRESSURE: 60 MMHG | SYSTOLIC BLOOD PRESSURE: 106 MMHG | HEIGHT: 60 IN | WEIGHT: 128 LBS | BODY MASS INDEX: 25.13 KG/M2 | OXYGEN SATURATION: 98 % | HEART RATE: 76 BPM

## 2022-04-22 PROBLEM — Z01.818 PREOPERATIVE CLEARANCE: Status: ACTIVE | Noted: 2022-04-22

## 2022-04-22 PROBLEM — D18.02 CAVERNOUS HEMANGIOMA OF BRAIN: Status: ACTIVE | Noted: 2019-06-04

## 2022-04-22 LAB
CHOLEST SERPL-MCNC: 182 MG/DL
ESTIMATED AVERAGE GLUCOSE: 105 MG/DL
HBA1C MFR BLD HPLC: 5.3 %
HDLC SERPL-MCNC: 40 MG/DL
LDLC SERPL CALC-MCNC: 109 MG/DL
NONHDLC SERPL-MCNC: 142 MG/DL
TRIGL SERPL-MCNC: 166 MG/DL

## 2022-04-22 PROCEDURE — 93000 ELECTROCARDIOGRAM COMPLETE: CPT

## 2022-04-22 PROCEDURE — 99214 OFFICE O/P EST MOD 30 MIN: CPT | Mod: 25

## 2022-04-22 RX ORDER — PSYLLIUM HUSK 0.4 G
CAPSULE ORAL
Refills: 0 | Status: DISCONTINUED | COMMUNITY
End: 2022-04-22

## 2022-04-22 RX ORDER — MISOPROSTOL 200 UG/1
200 TABLET ORAL
Qty: 2 | Refills: 0 | Status: DISCONTINUED | COMMUNITY
Start: 2021-12-09 | End: 2022-04-22

## 2022-04-22 RX ORDER — ADHESIVE TAPE 3"X 2.3 YD
50 MCG TAPE, NON-MEDICATED TOPICAL
Qty: 90 | Refills: 0 | Status: DISCONTINUED | COMMUNITY
Start: 2020-08-28 | End: 2022-04-22

## 2022-04-22 RX ORDER — SERTRALINE 25 MG/1
25 TABLET, FILM COATED ORAL DAILY
Qty: 135 | Refills: 3 | Status: DISCONTINUED | COMMUNITY
Start: 2018-11-18 | End: 2022-04-22

## 2022-04-22 NOTE — RESULTS/DATA
[] : results reviewed [de-identified] : unremarkable [de-identified] : unremarkable [de-identified] : o +\par AB Negative  [de-identified] : as per cardiology

## 2022-04-22 NOTE — PHYSICAL EXAM
[No Acute Distress] : no acute distress [Normal Sclera/Conjunctiva] : normal sclera/conjunctiva [No JVD] : no jugular venous distention [Supple] : supple [No Respiratory Distress] : no respiratory distress  [Clear to Auscultation] : lungs were clear to auscultation bilaterally [Regular Rhythm] : with a regular rhythm [Normal S1, S2] : normal S1 and S2 [No Edema] : there was no peripheral edema [Soft] : abdomen soft [Non Tender] : non-tender [Normal Supraclavicular Nodes] : no supraclavicular lymphadenopathy [No Spinal Tenderness] : no spinal tenderness [Normal] : no joint swelling and grossly normal strength and tone [No Focal Deficits] : no focal deficits [Normal Gait] : normal gait [Alert and Oriented x3] : oriented to person, place, and time [Normal Insight/Judgement] : insight and judgment were intact [de-identified] : engaging  [de-identified] : KIM

## 2022-04-22 NOTE — REVIEW OF SYSTEMS
[Anxiety] : anxiety [Negative] : Neurological [Fever] : no fever [Fatigue] : no fatigue [Night Sweats] : no night sweats [FreeTextEntry3] : eyeglasses [de-identified] : CHRONIC

## 2022-04-22 NOTE — CARDIOLOGY SUMMARY
[de-identified] : 4/22/2022, NSR with LAE and poor R wave progression. [de-identified] : 1/7/2022, Pharmacologic Nuclear Stress Testing: no ischemia or evidence of prior infarction noted on SPECT images.\par  [de-identified] : 1/7/2022, LV EF 60%, trace MR, mild-moderate AI, mild TR\par 12/9/2020, normal LV systolic function, mild AI, trace MR, trace TR with estimated PASP of 24mmHg.

## 2022-04-22 NOTE — PHYSICAL EXAM
[Normal S1, S2] : normal S1, S2 [Normal] : moves all extremities, no focal deficits, normal speech [de-identified] : No carotid bruits auscultated bilaterally [de-identified] : 2/6 systolic ejection murmur

## 2022-04-22 NOTE — PLAN
[FreeTextEntry1] : HCC   cavernous hemangioma  incidental finding on MRI in 12/20 ;  advised  "no need to FU".

## 2022-04-22 NOTE — HISTORY OF PRESENT ILLNESS
[No Pertinent Cardiac History] : no history of aortic stenosis, atrial fibrillation, coronary artery disease, recent myocardial infarction, or implantable device/pacemaker [No Pertinent Pulmonary History] : no history of asthma, COPD, sleep apnea, or smoking [(Patient denies any chest pain, claudication, dyspnea on exertion, orthopnea, palpitations or syncope)] : Patient denies any chest pain, claudication, dyspnea on exertion, orthopnea, palpitations or syncope [Good (7-10 METs)] : Good (7-10 METs) [Chronic Anticoagulation] : no chronic anticoagulation [Chronic Kidney Disease] : no chronic kidney disease [Diabetes] : no diabetes [FreeTextEntry1] : Hysteroscopy [FreeTextEntry2] : 4/26/2022 [FreeTextEntry3] : Dr Jorge L Ashley  [FreeTextEntry4] : [facility] \par [address], [facility],[address]\par Phone [phone]\par Fax [phone] or [phone]\par \par EM polyp removal  \par \par 71 year old WF with PMH anxiety and HLD and mild AV Insufficiency ,  All STABLE  [FreeTextEntry5] : anticipating cardiac clearance next week.

## 2022-04-22 NOTE — HISTORY OF PRESENT ILLNESS
[FreeTextEntry1] : Historical Perspective:\par 71 year old female with history of HLD, mild aortic valve insufficiency presents for a cardiac evaluation. Patient suffers from anxiety at times and can get a discomfort in her chest when she is anxious. No associated SOB or palpitations. \par \par There is no history of MI, CVA, CHF, or previous coronary intervention.\par \par Current Health Status:\par Patient with no SOB, or palpitations. No hospitalizations since seeing me last. Remains compliant with his medications and reports no adverse effects. Continues with chest pain induced by her anxiety. Patient is scheduled for a D&C on 4/26/2022. \par \par

## 2022-04-22 NOTE — DISCUSSION/SUMMARY
[FreeTextEntry1] : 1. Chest pain: induced by her anxiety. Normal pharmacologic nuclear stress testing, January 2022. \par \par 2. AI: mild-moderate on echocardiogram from January 2022. Recommend periodic echo surveillance. \par \par 3. HLD: continue rosuvastatin 10mg daily\par \par Follow up in 6 months. \par \par The patient has greater than 4 METs activity level without exertional complaints. There are no acute ECG changes, no murmur of aortic stenosis, and no clinical signs of heart failure. Patient is optimized from a cardiology standpoint to undergo the planned surgical procedure.\par

## 2022-04-22 NOTE — ASSESSMENT
[Patient Optimized for Surgery] : Patient optimized for surgery [FreeTextEntry4] :  At this time, I see no absolute  contraindication for proposed procedure  PENDING results of COVID testing. \par Reviewed, and advised no aspirin, NSAIDs, fish oil vitamin E. one week prior to procedure.\par If you take BP medication, take the AM of surgery with small sip of water. \par Advised to ensure normal BM day prior to surgery.\par Strategies to achieve reviewed. \par \par Best wishes offered.\par

## 2022-04-26 ENCOUNTER — OUTPATIENT (OUTPATIENT)
Dept: OUTPATIENT SERVICES | Facility: HOSPITAL | Age: 72
LOS: 1 days | End: 2022-04-26
Payer: MEDICARE

## 2022-04-26 ENCOUNTER — APPOINTMENT (OUTPATIENT)
Dept: OBGYN | Facility: HOSPITAL | Age: 72
End: 2022-04-26

## 2022-04-26 PROCEDURE — 58558 HYSTEROSCOPY BIOPSY: CPT

## 2022-04-26 PROCEDURE — 88305 TISSUE EXAM BY PATHOLOGIST: CPT | Mod: 26

## 2022-05-01 DIAGNOSIS — N84.0 POLYP OF CORPUS UTERI: ICD-10-CM

## 2022-05-01 DIAGNOSIS — K21.9 GASTRO-ESOPHAGEAL REFLUX DISEASE WITHOUT ESOPHAGITIS: ICD-10-CM

## 2022-05-01 DIAGNOSIS — E78.5 HYPERLIPIDEMIA, UNSPECIFIED: ICD-10-CM

## 2022-05-01 DIAGNOSIS — N88.2 STRICTURE AND STENOSIS OF CERVIX UTERI: ICD-10-CM

## 2022-05-03 DIAGNOSIS — N39.0 URINARY TRACT INFECTION, SITE NOT SPECIFIED: ICD-10-CM

## 2022-05-08 ENCOUNTER — LABORATORY RESULT (OUTPATIENT)
Age: 72
End: 2022-05-08

## 2022-05-09 ENCOUNTER — APPOINTMENT (OUTPATIENT)
Dept: OBGYN | Facility: CLINIC | Age: 72
End: 2022-05-09

## 2022-05-09 VITALS
DIASTOLIC BLOOD PRESSURE: 58 MMHG | HEIGHT: 60 IN | WEIGHT: 132 LBS | BODY MASS INDEX: 25.91 KG/M2 | SYSTOLIC BLOOD PRESSURE: 124 MMHG

## 2022-05-09 DIAGNOSIS — Z98.890 OTHER SPECIFIED POSTPROCEDURAL STATES: ICD-10-CM

## 2022-05-09 PROCEDURE — 99212 OFFICE O/P EST SF 10 MIN: CPT

## 2022-05-09 NOTE — HISTORY OF PRESENT ILLNESS
[Pain is well-controlled] : pain is well-controlled [Pathology reviewed] : pathology reviewed [Fever] : no fever [Chills] : no chills [Nausea] : no nausea [Vomiting] : no vomiting [de-identified] : 13 [de-identified] : d&C, hysteroscopy, myosure [de-identified] : endometrial polyp [de-identified] : Patient here for post op visit. Patient denies pain. Normal bowel movements . No vb. Good appetite.SHe called on 5/3 for uti like sx - she was prescribed antibiotics - she feels better\par \par path: benign endometrial polyp\par

## 2022-06-17 ENCOUNTER — APPOINTMENT (OUTPATIENT)
Dept: FAMILY MEDICINE | Facility: CLINIC | Age: 72
End: 2022-06-17
Payer: MEDICARE

## 2022-06-17 VITALS
RESPIRATION RATE: 16 BRPM | OXYGEN SATURATION: 98 % | WEIGHT: 126 LBS | SYSTOLIC BLOOD PRESSURE: 130 MMHG | TEMPERATURE: 97.6 F | HEIGHT: 60 IN | DIASTOLIC BLOOD PRESSURE: 84 MMHG | HEART RATE: 76 BPM | BODY MASS INDEX: 24.74 KG/M2

## 2022-06-17 DIAGNOSIS — K59.09 OTHER CONSTIPATION: ICD-10-CM

## 2022-06-17 PROCEDURE — 99214 OFFICE O/P EST MOD 30 MIN: CPT

## 2022-06-20 NOTE — ASSESSMENT
[FreeTextEntry1] : At this time we agree to a trial of MiraLAX  BID for 3 days and call to assess. \par encouraged 80 ounces of water daily.\par \par Keep food diary.

## 2022-06-20 NOTE — HISTORY OF PRESENT ILLNESS
[FreeTextEntry8] : Accompanied by ; \par \par pt would like to discuss having abnormal bowel movements for a few weeks, varies between diarrhea, constipation or hailey \par would like to discuss bloating as well \par \par Seen in consultation with her GI MD earlier in the week;  No imaging ordered ;  advised diet journal\par \par Recent GYN evaluation ad s/p hysteroscopy.\par \par When queried does acknowledge constipation. CHRONIC over the years.\par \par Expresses much anxiety about her health "always" and  endorses same. \par \par

## 2022-06-20 NOTE — PHYSICAL EXAM
[Well Nourished] : well nourished [Normal Sclera/Conjunctiva] : normal sclera/conjunctiva [No JVD] : no jugular venous distention [No Respiratory Distress] : no respiratory distress  [No Accessory Muscle Use] : no accessory muscle use [Regular Rhythm] : with a regular rhythm [Soft] : abdomen soft [Non Tender] : non-tender [No Focal Deficits] : no focal deficits [Alert and Oriented x3] : oriented to person, place, and time [Normal Insight/Judgement] : insight and judgment were intact [de-identified] : anxious  [de-identified] : KIM

## 2022-07-06 ENCOUNTER — APPOINTMENT (OUTPATIENT)
Dept: CT IMAGING | Facility: CLINIC | Age: 72
End: 2022-07-06

## 2022-07-06 PROCEDURE — 74177 CT ABD & PELVIS W/CONTRAST: CPT | Mod: MH

## 2022-07-18 ENCOUNTER — NON-APPOINTMENT (OUTPATIENT)
Age: 72
End: 2022-07-18

## 2022-08-18 ENCOUNTER — APPOINTMENT (OUTPATIENT)
Dept: CT IMAGING | Facility: CLINIC | Age: 72
End: 2022-08-18

## 2022-08-18 ENCOUNTER — RESULT REVIEW (OUTPATIENT)
Age: 72
End: 2022-08-18

## 2022-08-18 PROCEDURE — 74177 CT ABD & PELVIS W/CONTRAST: CPT | Mod: MH

## 2022-08-25 ENCOUNTER — NON-APPOINTMENT (OUTPATIENT)
Age: 72
End: 2022-08-25

## 2022-08-29 ENCOUNTER — APPOINTMENT (OUTPATIENT)
Dept: OBGYN | Facility: CLINIC | Age: 72
End: 2022-08-29

## 2022-08-29 VITALS
BODY MASS INDEX: 24.54 KG/M2 | DIASTOLIC BLOOD PRESSURE: 80 MMHG | HEIGHT: 60 IN | WEIGHT: 125 LBS | SYSTOLIC BLOOD PRESSURE: 132 MMHG

## 2022-08-29 DIAGNOSIS — R93.89 ABNORMAL FINDINGS ON DIAGNOSTIC IMAGING OF OTHER SPECIFIED BODY STRUCTURES: ICD-10-CM

## 2022-08-29 PROCEDURE — 99214 OFFICE O/P EST MOD 30 MIN: CPT

## 2022-08-29 NOTE — HISTORY OF PRESENT ILLNESS
[FreeTextEntry1] : 72 yr old PMF with thickened endometrial lining found incidentally on CT scan. Patient has h/o endometrial polyp s/p hysterosocpy D&C myosyre 4/26/2022. Patint denies any bleeding currently. \par Will order tvus and then possible schedule repeat hysteroscopy D&C depending on results (sampling in office was not possible)\par Will call with results\par due for annual 12/2022

## 2022-08-30 ENCOUNTER — NON-APPOINTMENT (OUTPATIENT)
Age: 72
End: 2022-08-30

## 2022-08-30 ENCOUNTER — APPOINTMENT (OUTPATIENT)
Dept: ANTEPARTUM | Facility: CLINIC | Age: 72
End: 2022-08-30

## 2022-08-30 ENCOUNTER — ASOB RESULT (OUTPATIENT)
Age: 72
End: 2022-08-30

## 2022-08-30 PROCEDURE — 76856 US EXAM PELVIC COMPLETE: CPT | Mod: 59

## 2022-08-30 PROCEDURE — 76830 TRANSVAGINAL US NON-OB: CPT

## 2022-09-07 ENCOUNTER — OUTPATIENT (OUTPATIENT)
Dept: OUTPATIENT SERVICES | Facility: HOSPITAL | Age: 72
LOS: 1 days | End: 2022-09-07

## 2022-09-07 PROCEDURE — 88312 SPECIAL STAINS GROUP 1: CPT | Mod: 26

## 2022-09-07 PROCEDURE — 88305 TISSUE EXAM BY PATHOLOGIST: CPT | Mod: 26

## 2022-09-14 DIAGNOSIS — F41.9 ANXIETY DISORDER, UNSPECIFIED: ICD-10-CM

## 2022-09-14 DIAGNOSIS — R10.13 EPIGASTRIC PAIN: ICD-10-CM

## 2022-09-14 DIAGNOSIS — M81.0 AGE-RELATED OSTEOPOROSIS WITHOUT CURRENT PATHOLOGICAL FRACTURE: ICD-10-CM

## 2022-09-14 DIAGNOSIS — K20.90 ESOPHAGITIS, UNSPECIFIED WITHOUT BLEEDING: ICD-10-CM

## 2022-09-14 DIAGNOSIS — K31.7 POLYP OF STOMACH AND DUODENUM: ICD-10-CM

## 2022-09-14 DIAGNOSIS — K29.70 GASTRITIS, UNSPECIFIED, WITHOUT BLEEDING: ICD-10-CM

## 2022-09-15 ENCOUNTER — TRANSCRIPTION ENCOUNTER (OUTPATIENT)
Age: 72
End: 2022-09-15

## 2022-10-13 ENCOUNTER — NON-APPOINTMENT (OUTPATIENT)
Age: 72
End: 2022-10-13

## 2022-10-13 ENCOUNTER — APPOINTMENT (OUTPATIENT)
Dept: CARDIOLOGY | Facility: CLINIC | Age: 72
End: 2022-10-13

## 2022-10-13 VITALS
OXYGEN SATURATION: 96 % | SYSTOLIC BLOOD PRESSURE: 106 MMHG | DIASTOLIC BLOOD PRESSURE: 60 MMHG | HEIGHT: 60 IN | HEART RATE: 67 BPM

## 2022-10-13 PROCEDURE — 99214 OFFICE O/P EST MOD 30 MIN: CPT

## 2022-10-13 PROCEDURE — 93000 ELECTROCARDIOGRAM COMPLETE: CPT

## 2022-10-13 RX ORDER — COLD-HOT PACK
EACH MISCELLANEOUS
Refills: 0 | Status: DISCONTINUED | COMMUNITY
End: 2022-10-13

## 2022-10-13 RX ORDER — GINGER ROOT/GINGER ROOT EXT 262.5 MG
CAPSULE ORAL
Refills: 0 | Status: DISCONTINUED | COMMUNITY
End: 2022-10-13

## 2022-10-13 RX ORDER — NITROFURANTOIN (MONOHYDRATE/MACROCRYSTALS) 25; 75 MG/1; MG/1
100 CAPSULE ORAL
Qty: 14 | Refills: 0 | Status: DISCONTINUED | COMMUNITY
Start: 2022-05-03 | End: 2022-10-13

## 2022-10-13 NOTE — PHYSICAL EXAM
[Normal S1, S2] : normal S1, S2 [Normal] : moves all extremities, no focal deficits, normal speech [de-identified] : No carotid bruits auscultated bilaterally [de-identified] : 2/6 systolic ejection murmur

## 2022-10-13 NOTE — DISCUSSION/SUMMARY
[FreeTextEntry1] : 1. Chest pain: induced by her anxiety. Normal pharmacologic nuclear stress testing, January 2022. \par \par 2. AI: mild-moderate on echocardiogram from January 2022. Recommend periodic echo surveillance. \par \par 3. HLD: continue rosuvastatin 10mg daily\par \par Follow up in 6 months. \par \par \par  [EKG obtained to assist in diagnosis and management of assessed problem(s)] : EKG obtained to assist in diagnosis and management of assessed problem(s)

## 2022-10-13 NOTE — HISTORY OF PRESENT ILLNESS
[FreeTextEntry1] : Historical Perspective:\par 71 year old female with history of HLD, mild aortic valve insufficiency presents for a cardiac evaluation. Patient suffers from anxiety at times and can get a discomfort in her chest when she is anxious. No associated SOB or palpitations. \par \par There is no history of MI, CVA, CHF, or previous coronary intervention.\par \par Current Health Status:\par Patient with no SOB, or palpitations. No chest pain recently.  No hospitalizations since seeing me last. Remains compliant with his medications and reports no adverse effects. \par \par

## 2022-10-13 NOTE — CARDIOLOGY SUMMARY
[de-identified] : 10/13/2022, NSR with poor R wave progression. [de-identified] : 1/7/2022, Pharmacologic Nuclear Stress Testing: no ischemia or evidence of prior infarction noted on SPECT images.\par  [de-identified] : 1/7/2022, LV EF 60%, trace MR, mild-moderate AI, mild TR\par 12/9/2020, normal LV systolic function, mild AI, trace MR, trace TR with estimated PASP of 24mmHg.

## 2022-11-17 ENCOUNTER — NON-APPOINTMENT (OUTPATIENT)
Age: 72
End: 2022-11-17

## 2022-11-17 ENCOUNTER — APPOINTMENT (OUTPATIENT)
Dept: OPHTHALMOLOGY | Facility: CLINIC | Age: 72
End: 2022-11-17

## 2022-11-17 PROCEDURE — 92004 COMPRE OPH EXAM NEW PT 1/>: CPT

## 2022-12-12 ENCOUNTER — APPOINTMENT (OUTPATIENT)
Dept: OBGYN | Facility: CLINIC | Age: 72
End: 2022-12-12

## 2022-12-15 ENCOUNTER — APPOINTMENT (OUTPATIENT)
Dept: OBGYN | Facility: CLINIC | Age: 72
End: 2022-12-15

## 2022-12-15 VITALS
DIASTOLIC BLOOD PRESSURE: 78 MMHG | SYSTOLIC BLOOD PRESSURE: 112 MMHG | BODY MASS INDEX: 25.32 KG/M2 | HEIGHT: 60 IN | WEIGHT: 129 LBS

## 2022-12-15 DIAGNOSIS — R39.15 URGENCY OF URINATION: ICD-10-CM

## 2022-12-15 DIAGNOSIS — Z01.419 ENCOUNTER FOR GYNECOLOGICAL EXAMINATION (GENERAL) (ROUTINE) W/OUT ABNORMAL FINDINGS: ICD-10-CM

## 2022-12-15 PROCEDURE — 99213 OFFICE O/P EST LOW 20 MIN: CPT | Mod: 25

## 2022-12-15 PROCEDURE — P9615: CPT

## 2022-12-15 PROCEDURE — G0101: CPT

## 2022-12-15 NOTE — DISCUSSION/SUMMARY
[FreeTextEntry1] : 72 year old PMF here for wwe, nl exam\par - urgency - occasional, ucx ent, if becomes more frequent she will reach out, advised to taper on caffeine products\par - pap/hpv UTD - desires pap/hpv q2 years\par - sbe reveiwed, Mammo UTD\par - Dexa UTD, vitamin D and calcium, weight bearing exercises recommended\par - colonoscopy UTD\par - n/v 6 months - patient would like to follow every 6 months\par - postmenopausal bleeding precautions given\par

## 2022-12-15 NOTE — HISTORY OF PRESENT ILLNESS
[FreeTextEntry1] : JOMAR MTZ is a 72 year F  LMP 51 yrs old here for annual visit but also occasional urgency with urination if she holds it too long. Patient is not currently sexually active. \par \par Gynhx: h/o Reg menses, No h/o STIs/fibroids/cysts; pt has a very remote h/o abn paps\par Obhx: c/s x3\par \par Last mammo: last mammo/sono 2022\par Last Colonoscopy: 2019 - normal per patient - she said she was told she didn’t need to have anymore, shes gets endoscopies every few years for GERD\par Last Pap smear:  - nilm, hpv neg\par Last dexa: 2022 - osteopenia

## 2022-12-17 ENCOUNTER — TRANSCRIPTION ENCOUNTER (OUTPATIENT)
Age: 72
End: 2022-12-17

## 2022-12-17 LAB — BACTERIA UR CULT: NORMAL

## 2023-01-07 ENCOUNTER — TRANSCRIPTION ENCOUNTER (OUTPATIENT)
Age: 73
End: 2023-01-07

## 2023-01-10 LAB
ALBUMIN SERPL ELPH-MCNC: 4.1 G/DL
ALP BLD-CCNC: 58 U/L
ALT SERPL-CCNC: 16 U/L
ANION GAP SERPL CALC-SCNC: 9 MMOL/L
APPEARANCE: CLEAR
AST SERPL-CCNC: 15 U/L
BASOPHILS # BLD AUTO: 0.02 K/UL
BASOPHILS NFR BLD AUTO: 0.3 %
BILIRUB SERPL-MCNC: 0.3 MG/DL
BILIRUBIN URINE: NEGATIVE
BLOOD URINE: NEGATIVE
BUN SERPL-MCNC: 11 MG/DL
CALCIUM SERPL-MCNC: 9.4 MG/DL
CHLORIDE SERPL-SCNC: 101 MMOL/L
CHOLEST SERPL-MCNC: 183 MG/DL
CO2 SERPL-SCNC: 30 MMOL/L
COLOR: YELLOW
CREAT SERPL-MCNC: 0.59 MG/DL
EGFR: 96 ML/MIN/1.73M2
EOSINOPHIL # BLD AUTO: 0.05 K/UL
EOSINOPHIL NFR BLD AUTO: 0.8 %
ESTIMATED AVERAGE GLUCOSE: 108 MG/DL
GLUCOSE QUALITATIVE U: NEGATIVE
GLUCOSE SERPL-MCNC: 91 MG/DL
HBA1C MFR BLD HPLC: 5.4 %
HCT VFR BLD CALC: 44.5 %
HDLC SERPL-MCNC: 37 MG/DL
HGB BLD-MCNC: 14.1 G/DL
IMM GRANULOCYTES NFR BLD AUTO: 0.8 %
KETONES URINE: NEGATIVE
LDLC SERPL CALC-MCNC: 117 MG/DL
LEUKOCYTE ESTERASE URINE: NEGATIVE
LYMPHOCYTES # BLD AUTO: 1.57 K/UL
LYMPHOCYTES NFR BLD AUTO: 23.7 %
MAN DIFF?: NORMAL
MCHC RBC-ENTMCNC: 29.4 PG
MCHC RBC-ENTMCNC: 31.7 GM/DL
MCV RBC AUTO: 92.7 FL
MONOCYTES # BLD AUTO: 0.53 K/UL
MONOCYTES NFR BLD AUTO: 8 %
NEUTROPHILS # BLD AUTO: 4.4 K/UL
NEUTROPHILS NFR BLD AUTO: 66.4 %
NITRITE URINE: NEGATIVE
NONHDLC SERPL-MCNC: 146 MG/DL
PH URINE: 8.5
PLATELET # BLD AUTO: 271 K/UL
POTASSIUM SERPL-SCNC: 3.9 MMOL/L
PROT SERPL-MCNC: 6.3 G/DL
PROTEIN URINE: NEGATIVE
RBC # BLD: 4.8 M/UL
RBC # FLD: 15.1 %
SODIUM SERPL-SCNC: 140 MMOL/L
SPECIFIC GRAVITY URINE: 1.01
TRIGL SERPL-MCNC: 143 MG/DL
TSH SERPL-ACNC: 1.45 UIU/ML
UROBILINOGEN URINE: NORMAL
VIT B12 SERPL-MCNC: 982 PG/ML
WBC # FLD AUTO: 6.62 K/UL

## 2023-01-13 ENCOUNTER — APPOINTMENT (OUTPATIENT)
Dept: FAMILY MEDICINE | Facility: CLINIC | Age: 73
End: 2023-01-13
Payer: MEDICARE

## 2023-01-13 VITALS
SYSTOLIC BLOOD PRESSURE: 120 MMHG | RESPIRATION RATE: 16 BRPM | OXYGEN SATURATION: 97 % | HEART RATE: 69 BPM | WEIGHT: 127 LBS | DIASTOLIC BLOOD PRESSURE: 70 MMHG | TEMPERATURE: 98 F | HEIGHT: 60 IN | BODY MASS INDEX: 24.94 KG/M2

## 2023-01-13 DIAGNOSIS — Z76.89 PERSONS ENCOUNTERING HEALTH SERVICES IN OTHER SPECIFIED CIRCUMSTANCES: ICD-10-CM

## 2023-01-13 PROCEDURE — G0439: CPT

## 2023-01-13 RX ORDER — FAMOTIDINE 10 MG/1
10 TABLET, FILM COATED ORAL
Refills: 0 | Status: COMPLETED | COMMUNITY
End: 2023-01-13

## 2023-01-21 PROBLEM — Z76.89 ENCOUNTER TO ESTABLISH CARE: Status: RESOLVED | Noted: 2022-01-12 | Resolved: 2023-01-21

## 2023-01-21 NOTE — ASSESSMENT
[FreeTextEntry1] : Annual exam for 72  year old WF with PMH as stated in HPI / active list. \par \par Management : \par \par See HPI and Plan\par \par Labs in office today.   Will advise. \par \par Medications reconciled. \par \par Best wishes offered ! \par \par

## 2023-01-21 NOTE — HISTORY OF PRESENT ILLNESS
[FreeTextEntry1] :  [place]  Last seen for same in Jan. 2022 and reviewed. \par Takes many supplements ; reviewed today and modified. \par \par Since has been seen in consultation with cardiology RFU on Mild aortic valve /HLD;stable\par \par GYN   Annual exam as well as FU /surveillance of thickened EM  TVUS WNL  NO BX \par \par Opthalmology  cataract  stable \par \par and GI for IBS FLARE PRN Omeprazole 20 mgs  EGD 9/7/22 Dr. Araujo reviewed ; appreciated for Grade A esophagitis \par \par Couldn't get an appt. with Dr. Araujo, so revisited her GI MD in [place]  Dr. Roblero\par \par Endorses COVID one week ago. Doing fine.  [de-identified] : In review: Jan. 2022 \par \par Ms. JOMAR MTZ presents today to establish care being referred to me by her own research \par Recent full-time move from [place] to Home/ [facility]  in [facility]; "very Happy"\par \par She is an affable 71 year old female with PMH significant for mild aortic valve insufficiency and HLD ,  GERD , OA and  anxiety ( especially as it relates to her health)  incidental cavernous hemangioma ( stable; MRI of Brain 12/20) ) , BPV and EM polyp( anticipating bx next week) \par \par PSH significant for emergent  surgery , Dr Tovar at [facility] in  July for 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 \par \par Denies any recent MVA's or MSK injuries. \par \par Providers:  GI  Dr. Gunnar Roblero\par                    GYN  Dr. Jorge L Ashley \par                    Psych [facility] in [place];  Therapist Nasreen  ; also engages in Mindfulness Group. \par                    Ortho  Dr. Stanley  new encounter next week \par \par  HM:   Colonoscopy  2019 FU 10 years \par           Mammo Aug. 2021  [facility] 1 \par \par Social:   Ezra; ( healthy) l \par             3 adult children daughter  in [place] ; son in  [place]  and one in [place] (). \par             7 GC; \par             Retired ; [employer]  6 th grade \par             walks most days and [employer] classes  on   [employer] \par \par Family trip planned to celebrate 50 years wedding anniversary in February to [place].\par               \par \par

## 2023-01-21 NOTE — HEALTH RISK ASSESSMENT
[Good] : ~his/her~ current health as good [Fair] :  ~his/her~ mood as fair [Never] : Never [Yes] : Yes [1 or 2 (0 pts)] : 1 or 2 (0 points) [Never (0 pts)] : Never (0 points) [No] : In the past 12 months have you used drugs other than those required for medical reasons? No [No falls in past year] : Patient reported no falls in the past year [0] : 2) Feeling down, depressed, or hopeless: Not at all (0) [PHQ-2 Negative - No further assessment needed] : PHQ-2 Negative - No further assessment needed [With Significant Other] : lives with significant other [# of Members in Household ___] :  household currently consist of [unfilled] member(s) [Retired] : retired [College] : College [] :  [# Of Children ___] : has [unfilled] children [Feels Safe at Home] : Feels safe at home [Fully functional (bathing, dressing, toileting, transferring, walking, feeding)] : Fully functional (bathing, dressing, toileting, transferring, walking, feeding) [Fully functional (using the telephone, shopping, preparing meals, housekeeping, doing laundry, using] : Fully functional and needs no help or supervision to perform IADLs (using the telephone, shopping, preparing meals, housekeeping, doing laundry, using transportation, managing medications and managing finances) [Reports changes in hearing] : Reports changes in hearing [Reports changes in vision] : Reports changes in vision [Smoke Detector] : smoke detector [Carbon Monoxide Detector] : carbon monoxide detector [Safety elements used in home] : safety elements used in home [Seat Belt] :  uses seat belt [Sunscreen] : uses sunscreen [Patient reported mammogram was normal] : Patient reported mammogram was normal [Patient reported PAP Smear was normal] : Patient reported PAP Smear was normal [Patient reported colonoscopy was normal] : Patient reported colonoscopy was normal [FreeTextEntry1] : Patient wants to go over all medications  [de-identified] : Denies [de-identified] : as noted  [de-identified] : purchased  "the mirror" and loves it! [BMP2Etixl] : 0 [Change in mental status noted] : No change in mental status noted [Language] : denies difficulty with language [Behavior] : denies difficulty with behavior [Learning/Retaining New Information] : denies difficulty learning/retaining new information [Handling Complex Tasks] : denies difficulty handling complex tasks [Reasoning] : denies difficulty with reasoning [Spatial Ability and Orientation] : denies difficulty with spatial ability and orientation [Reports normal functional visual acuity (ie: able to read med bottle)] : Reports poor functional visual acuity.  [Reports changes in dental health] : Reports no changes in dental health [Guns at Home] : no guns at home [TB Exposure] : is not being exposed to tuberculosis [Travel to Developing Areas] : does not  travel to developing areas [Caregiver Concerns] : does not have caregiver concerns [MammogramDate] : 8/22 [PapSmearDate] : 2021 [ColonoscopyDate] : 2019 [ColonoscopyComments] : hemorrhoids [FreeTextEntry2] :

## 2023-01-21 NOTE — PHYSICAL EXAM
[No Acute Distress] : no acute distress [Normal Sclera/Conjunctiva] : normal sclera/conjunctiva [No JVD] : no jugular venous distention [No Respiratory Distress] : no respiratory distress  [No Accessory Muscle Use] : no accessory muscle use [Clear to Auscultation] : lungs were clear to auscultation bilaterally [Regular Rhythm] : with a regular rhythm [Normal S1, S2] : normal S1 and S2 [No Edema] : there was no peripheral edema [Normal Supraclavicular Nodes] : no supraclavicular lymphadenopathy [No Spinal Tenderness] : no spinal tenderness [No Joint Swelling] : no joint swelling [No Rash] : no rash [No Focal Deficits] : no focal deficits [Speech Grossly Normal] : speech grossly normal [Alert and Oriented x3] : oriented to person, place, and time [de-identified] : calm and engaging  [de-identified] : KIM [de-identified] : no trmors  [de-identified] : warm and dry

## 2023-04-07 ENCOUNTER — APPOINTMENT (OUTPATIENT)
Dept: ORTHOPEDIC SURGERY | Facility: CLINIC | Age: 73
End: 2023-04-07
Payer: MEDICARE

## 2023-04-07 VITALS — BODY MASS INDEX: 24.94 KG/M2 | HEIGHT: 60 IN | WEIGHT: 127 LBS

## 2023-04-07 DIAGNOSIS — M75.51 BURSITIS OF RIGHT SHOULDER: ICD-10-CM

## 2023-04-07 PROCEDURE — 99203 OFFICE O/P NEW LOW 30 MIN: CPT

## 2023-04-07 PROCEDURE — 73030 X-RAY EXAM OF SHOULDER: CPT | Mod: RT

## 2023-04-07 NOTE — ASSESSMENT
[FreeTextEntry1] : Right Subacromial Impingement\par The diagnosis and treatment options were discussed at length with the patient.  The pathophysiology of shoulder impingement syndrome was discussed, including bursitis in the subacromial space causing rotator cuff inflammation and pain with overhead activities.  Treatment options discussed including observation, activity modifications, oral anti-inflammatories, physical therapy, steroid injection, and advanced imaging. All of the patient's questions were answered to their satisfaction.  Plan for follow-up in 6 weeks time, and should the symptoms chelsey, the patient may cancel.\par \par F/u prn

## 2023-04-07 NOTE — HISTORY OF PRESENT ILLNESS
[de-identified] : 72F, RHD, PMHX of Anxiety, GERD, HLD presents with right shoulder pain since middle of March 2023. Patient reports was working out with her sister in law when she started to feel the pain. Admits to trying to rest to let it go away but hasn't. Denies outside imaging/treatment. Denies numbness/tingling.

## 2023-04-07 NOTE — IMAGING
[de-identified] : RIGHT SHOULDER EXAM\par +ttp anterolateral\par Skin intact\par No muscle atrophy noted\par Shoulder ROM\par   Forward flexion to 160°; contralateral shoulder 160°\par   Abduction to 160°\par   ER with elbow at side to 45°; contralateral shoulder 45°\par   IR to L1\par \par + Marc empty can test\par + Hawkin’s impingement test\par - Speed's test\par - Apprehension test\par - Lift-off test\par - Cross-body adduction test\par \par Rotator cuff strength is 4/5 throughout\par Hand is warm and well perfused with brisk capillary refill throughout\par Motor function intact to axillary, AIN, PIN, and ulnar nerves\par Sensation intact axillary, median, ulnar, and superficial radial nerves\par Elbow and wrist motion intact and full\par Patient able to make a composite fist\par \par Right shoulder radiographs with no fracture nor dislocation. Minimal arthrosis.\par

## 2023-04-11 ENCOUNTER — APPOINTMENT (OUTPATIENT)
Dept: CARDIOLOGY | Facility: CLINIC | Age: 73
End: 2023-04-11

## 2023-04-20 ENCOUNTER — APPOINTMENT (OUTPATIENT)
Dept: CARDIOLOGY | Facility: CLINIC | Age: 73
End: 2023-04-20
Payer: MEDICARE

## 2023-04-20 VITALS
SYSTOLIC BLOOD PRESSURE: 112 MMHG | OXYGEN SATURATION: 97 % | DIASTOLIC BLOOD PRESSURE: 64 MMHG | RESPIRATION RATE: 12 BRPM | HEART RATE: 68 BPM

## 2023-04-20 DIAGNOSIS — R07.89 OTHER CHEST PAIN: ICD-10-CM

## 2023-04-20 PROCEDURE — 93306 TTE W/DOPPLER COMPLETE: CPT

## 2023-04-20 PROCEDURE — 99214 OFFICE O/P EST MOD 30 MIN: CPT

## 2023-04-20 NOTE — CARDIOLOGY SUMMARY
[de-identified] : 10/13/2022, NSR with poor R wave progression. [de-identified] : 1/7/2022, Pharmacologic Nuclear Stress Testing: no ischemia or evidence of prior infarction noted on SPECT images.\par  [de-identified] : 4/20/2023, LV EF 60-65%, mild MR, mild-moderate AI, mild TR with estimated PASP of 28mmHg.\par 1/7/2022, LV EF 60%, trace MR, mild-moderate AI, mild TR\par 12/9/2020, normal LV systolic function, mild AI, trace MR, trace TR with estimated PASP of 24mmHg.

## 2023-04-20 NOTE — PHYSICAL EXAM
[Normal S1, S2] : normal S1, S2 [Normal] : moves all extremities, no focal deficits, normal speech [de-identified] : No carotid bruits auscultated bilaterally [de-identified] : 2/6 systolic ejection murmur

## 2023-04-20 NOTE — DISCUSSION/SUMMARY
[FreeTextEntry1] : 1. Chest pain: induced by her anxiety. Normal pharmacologic nuclear stress testing, January 2022. \par \par 2. AI: mild-moderate on echocardiogram from January 2022. Recommend periodic echo surveillance. \par \par 3. HLD: continue rosuvastatin 10mg daily\par \par Follow up in 6 months. \par \par \par

## 2023-04-21 VITALS
OXYGEN SATURATION: 97 % | SYSTOLIC BLOOD PRESSURE: 112 MMHG | WEIGHT: 125 LBS | TEMPERATURE: 98 F | HEIGHT: 60 IN | RESPIRATION RATE: 12 BRPM | HEART RATE: 68 BPM | BODY MASS INDEX: 24.54 KG/M2 | DIASTOLIC BLOOD PRESSURE: 64 MMHG

## 2023-04-21 RX ORDER — L.ACID,FERM,PLA,RHA/B.BIF,LONG 126 MG
TABLET, DELAYED AND EXTENDED RELEASE ORAL
Refills: 0 | Status: DISCONTINUED | COMMUNITY
End: 2023-04-21

## 2023-04-21 RX ORDER — OMEPRAZOLE 40 MG/1
40 CAPSULE, DELAYED RELEASE ORAL
Qty: 1 | Refills: 0 | Status: DISCONTINUED | COMMUNITY
Start: 2023-01-13 | End: 2023-04-21

## 2023-05-16 ENCOUNTER — APPOINTMENT (OUTPATIENT)
Dept: MRI IMAGING | Facility: CLINIC | Age: 73
End: 2023-05-16
Payer: MEDICARE

## 2023-05-16 PROCEDURE — 70543 MRI ORBT/FAC/NCK W/O &W/DYE: CPT | Mod: MH

## 2023-05-16 PROCEDURE — A9585: CPT | Mod: JW

## 2023-05-18 ENCOUNTER — APPOINTMENT (OUTPATIENT)
Dept: OPHTHALMOLOGY | Facility: CLINIC | Age: 73
End: 2023-05-18
Payer: MEDICARE

## 2023-05-18 ENCOUNTER — NON-APPOINTMENT (OUTPATIENT)
Age: 73
End: 2023-05-18

## 2023-05-18 PROCEDURE — 99214 OFFICE O/P EST MOD 30 MIN: CPT

## 2023-06-01 ENCOUNTER — APPOINTMENT (OUTPATIENT)
Dept: OPHTHALMOLOGY | Facility: CLINIC | Age: 73
End: 2023-06-01
Payer: MEDICARE

## 2023-06-01 ENCOUNTER — NON-APPOINTMENT (OUTPATIENT)
Age: 73
End: 2023-06-01

## 2023-06-01 PROCEDURE — 92136 OPHTHALMIC BIOMETRY: CPT

## 2023-06-01 PROCEDURE — 99213 OFFICE O/P EST LOW 20 MIN: CPT

## 2023-06-02 ENCOUNTER — APPOINTMENT (OUTPATIENT)
Dept: ORTHOPEDIC SURGERY | Facility: CLINIC | Age: 73
End: 2023-06-02

## 2023-06-07 ENCOUNTER — FORM ENCOUNTER (OUTPATIENT)
Age: 73
End: 2023-06-07

## 2023-06-08 ENCOUNTER — RESULT REVIEW (OUTPATIENT)
Age: 73
End: 2023-06-08

## 2023-06-08 ENCOUNTER — APPOINTMENT (OUTPATIENT)
Dept: CARDIOLOGY | Facility: CLINIC | Age: 73
End: 2023-06-08
Payer: MEDICARE

## 2023-06-08 ENCOUNTER — NON-APPOINTMENT (OUTPATIENT)
Age: 73
End: 2023-06-08

## 2023-06-08 ENCOUNTER — APPOINTMENT (OUTPATIENT)
Dept: MRI IMAGING | Facility: CLINIC | Age: 73
End: 2023-06-08
Payer: MEDICARE

## 2023-06-08 VITALS
OXYGEN SATURATION: 96 % | SYSTOLIC BLOOD PRESSURE: 112 MMHG | HEIGHT: 59 IN | HEART RATE: 67 BPM | DIASTOLIC BLOOD PRESSURE: 60 MMHG | BODY MASS INDEX: 25.8 KG/M2 | WEIGHT: 128 LBS

## 2023-06-08 PROCEDURE — 73221 MRI JOINT UPR EXTREM W/O DYE: CPT | Mod: RT,MH

## 2023-06-08 PROCEDURE — 99214 OFFICE O/P EST MOD 30 MIN: CPT

## 2023-06-08 PROCEDURE — 93000 ELECTROCARDIOGRAM COMPLETE: CPT | Mod: NC

## 2023-06-08 NOTE — CARDIOLOGY SUMMARY
[de-identified] : 10/13/2022, NSR with poor R wave progression. [de-identified] : 1/7/2022, Pharmacologic Nuclear Stress Testing: no ischemia or evidence of prior infarction noted on SPECT images.\par  [de-identified] : 4/20/2023, LV EF 60-65%, mild MR, mild-moderate AI, mild TR with estimated PASP of 28mmHg.\par 1/7/2022, LV EF 60%, trace MR, mild-moderate AI, mild TR\par 12/9/2020, normal LV systolic function, mild AI, trace MR, trace TR with estimated PASP of 24mmHg.

## 2023-06-08 NOTE — DISCUSSION/SUMMARY
[FreeTextEntry1] : 1. Chest pain: induced by her anxiety. Normal pharmacologic nuclear stress testing, January 2022. \par \par 2. AI: mild-moderate on echocardiogram from April 2023 (stable from previous). Recommend periodic echo surveillance. \par \par 3. HLD: continue rosuvastatin 10mg daily.\par \par The patient has greater than 4 METs activity level without exertional complaints. There are no acute ECG changes, no murmur of aortic stenosis, and no clinical signs of heart failure. Patient is optimized from a cardiology standpoint to undergo the planned surgical procedure.\par \par Follow up in 6 months. \par \par \par  [EKG obtained to assist in diagnosis and management of assessed problem(s)] : EKG obtained to assist in diagnosis and management of assessed problem(s)

## 2023-06-08 NOTE — PHYSICAL EXAM
[Normal S1, S2] : normal S1, S2 [Normal] : moves all extremities, no focal deficits, normal speech [de-identified] : No carotid bruits auscultated bilaterally [de-identified] : 2/6 systolic ejection murmur

## 2023-06-08 NOTE — HISTORY OF PRESENT ILLNESS
[FreeTextEntry1] : Historical Perspective:\par 71 year old female with history of HLD, mild aortic valve insufficiency presents for a cardiac evaluation. Patient suffers from anxiety at times and can get a discomfort in her chest when she is anxious. No associated SOB or palpitations. \par \par There is no history of MI, CVA, CHF, or previous coronary intervention.\par \par Current Health Status:\par Patient with no SOB, or palpitations. No chest pain recently.  No hospitalizations since seeing me last. Remains compliant with his medications and reports no adverse effects. Patient will be going for right eye cataract surgery 6/14/2023 and then the left eye cataract surgery 7/12/2023. \par \par

## 2023-06-14 ENCOUNTER — NON-APPOINTMENT (OUTPATIENT)
Age: 73
End: 2023-06-14

## 2023-06-14 ENCOUNTER — APPOINTMENT (OUTPATIENT)
Dept: OPHTHALMOLOGY | Facility: AMBULATORY MEDICAL SERVICES | Age: 73
End: 2023-06-14
Payer: MEDICARE

## 2023-06-14 PROCEDURE — 66984 XCAPSL CTRC RMVL W/O ECP: CPT | Mod: RT

## 2023-06-14 PROCEDURE — V2787: CPT

## 2023-06-15 ENCOUNTER — NON-APPOINTMENT (OUTPATIENT)
Age: 73
End: 2023-06-15

## 2023-06-15 ENCOUNTER — APPOINTMENT (OUTPATIENT)
Dept: OPHTHALMOLOGY | Facility: CLINIC | Age: 73
End: 2023-06-15
Payer: MEDICARE

## 2023-06-15 ENCOUNTER — APPOINTMENT (OUTPATIENT)
Dept: OBGYN | Facility: CLINIC | Age: 73
End: 2023-06-15
Payer: MEDICARE

## 2023-06-15 VITALS
SYSTOLIC BLOOD PRESSURE: 132 MMHG | WEIGHT: 130 LBS | DIASTOLIC BLOOD PRESSURE: 86 MMHG | BODY MASS INDEX: 26.21 KG/M2 | HEIGHT: 59 IN

## 2023-06-15 DIAGNOSIS — N84.0 POLYP OF CORPUS UTERI: ICD-10-CM

## 2023-06-15 LAB
BILIRUB UR QL STRIP: NORMAL
CLARITY UR: CLEAR
COLLECTION METHOD: NORMAL
GLUCOSE UR-MCNC: NORMAL
HCG UR QL: 0.2 EU/DL
HGB UR QL STRIP.AUTO: NORMAL
KETONES UR-MCNC: NORMAL
LEUKOCYTE ESTERASE UR QL STRIP: NORMAL
NITRITE UR QL STRIP: NORMAL
PH UR STRIP: 5.5
PROT UR STRIP-MCNC: NORMAL
SP GR UR STRIP: 1

## 2023-06-15 PROCEDURE — 99213 OFFICE O/P EST LOW 20 MIN: CPT

## 2023-06-15 PROCEDURE — 81003 URINALYSIS AUTO W/O SCOPE: CPT | Mod: QW

## 2023-06-15 PROCEDURE — 99024 POSTOP FOLLOW-UP VISIT: CPT

## 2023-06-15 NOTE — PHYSICAL EXAM
[Appropriately responsive] : appropriately responsive [Alert] : alert [No Acute Distress] : no acute distress [Regular Rate Rhythm] : regular rate rhythm [Soft] : soft [Non-tender] : non-tender [Non-distended] : non-distended [No HSM] : No HSM [No Lesions] : no lesions [No Mass] : no mass [Oriented x3] : oriented x3 [Labia Majora] : normal [Labia Minora] : normal [Normal] : normal [Uterine Adnexae] : normal [No Tenderness] : no tenderness [Nl Sphincter Tone] : normal sphincter tone [Internal Hemorrhoid] : internal hemorrhoid [External Hemorrhoid] : external hemorrhoid

## 2023-06-15 NOTE — DISCUSSION/SUMMARY
[FreeTextEntry1] : f/u annual 12/2023 - do pap then\par mammoa nd breast sono rx given\par urinary issues stable

## 2023-06-15 NOTE — HISTORY OF PRESENT ILLNESS
[FreeTextEntry1] : JOMAR MTZ is a 72 year F  LMP 51 yrs old here for 6 month f/u. asking for rectal exam - her GI wants one yearly. no bleeding, no urinary issues\par \par Gynhx: h/o Reg menses, No h/o STIs/fibroids/cysts; pt has a very remote h/o abn paps\par Obhx: c/s x3\par \par Last mammo: last mammo/sono 2022\par Last Colonoscopy: 2019 - normal per patient - she said she was told she didn’t need to have anymore, shes gets endoscopies every few years for GERD\par Last Pap smear:  - nilm, hpv neg\par Last dexa: 2022 - osteopenia

## 2023-06-16 ENCOUNTER — APPOINTMENT (OUTPATIENT)
Dept: ORTHOPEDIC SURGERY | Facility: CLINIC | Age: 73
End: 2023-06-16
Payer: MEDICARE

## 2023-06-16 DIAGNOSIS — M25.519 PAIN IN UNSPECIFIED SHOULDER: ICD-10-CM

## 2023-06-16 PROCEDURE — 99214 OFFICE O/P EST MOD 30 MIN: CPT

## 2023-06-16 RX ORDER — MELOXICAM 7.5 MG/1
7.5 TABLET ORAL
Qty: 30 | Refills: 0 | Status: ACTIVE | COMMUNITY
Start: 2023-06-16 | End: 1900-01-01

## 2023-06-16 NOTE — HISTORY OF PRESENT ILLNESS
[de-identified] : 72F, RHD, PMHX of Anxiety, GERD, HLD presents with right shoulder pain since middle of March 2023. Patient reports was working out with her sister in law when she started to feel the pain. Admits to trying to rest to let it go away but hasn't. Denies outside imaging/treatment. Denies numbness/tingling. \par \par 06/16/23 Patient is here to follow up on the RT shoulder. MRI done at  imaging 06/08/23. Excedrin prn pain. PT 2x a week and trigger point injections seem to be helping.

## 2023-06-16 NOTE — IMAGING
[de-identified] : RIGHT SHOULDER EXAM\par +ttp anterolateral\par Skin intact\par No muscle atrophy noted\par Shoulder ROM\par   Forward flexion to 160°; contralateral shoulder 160°\par   Abduction to 160°\par   ER with elbow at side to 45°; contralateral shoulder 45°\par   IR to L1\par \par + Marc empty can test\par + Hawkin’s impingement test\par - Speed's test\par - Apprehension test\par - Lift-off test\par - Cross-body adduction test\par \par Rotator cuff strength is 4/5 throughout\par Hand is warm and well perfused with brisk capillary refill throughout\par Motor function intact to axillary, AIN, PIN, and ulnar nerves\par Sensation intact axillary, median, ulnar, and superficial radial nerves\par Elbow and wrist motion intact and full\par Patient able to make a composite fist\par \par Right shoulder radiographs with no fracture nor dislocation. Minimal arthrosis.\par Right shoulder MRI with supra tear, complete, <1cm..\par

## 2023-06-16 NOTE — ASSESSMENT
[FreeTextEntry1] : Right Subacromial Impingement and supra tear.\par The diagnosis and treatment options were discussed at length with the patient.  The pathophysiology of shoulder impingement syndrome was discussed, including bursitis in the subacromial space causing rotator cuff inflammation and pain with overhead activities.  Treatment options discussed including observation, activity modifications, oral anti-inflammatories, physical therapy, steroid injection, and advanced imaging. All of the patient's questions were answered to their satisfaction.  Plan for follow-up in 6 weeks time, and should the symptoms chelsey, the patient may cancel.\par \par Reviewed MRI, will continue PT.\par \par F/u prn

## 2023-06-16 NOTE — REASON FOR VISIT
[FreeTextEntry2] : IMPRESSION:\par Subacute obliquely oriented full-thickness tear of supraspinatus with granulation tissue.

## 2023-06-22 ENCOUNTER — NON-APPOINTMENT (OUTPATIENT)
Age: 73
End: 2023-06-22

## 2023-06-22 ENCOUNTER — APPOINTMENT (OUTPATIENT)
Dept: OPHTHALMOLOGY | Facility: CLINIC | Age: 73
End: 2023-06-22
Payer: MEDICARE

## 2023-06-22 PROCEDURE — 99213 OFFICE O/P EST LOW 20 MIN: CPT | Mod: 24

## 2023-06-22 PROCEDURE — 92136 OPHTHALMIC BIOMETRY: CPT | Mod: 26,LT

## 2023-07-07 ENCOUNTER — APPOINTMENT (OUTPATIENT)
Dept: FAMILY MEDICINE | Facility: CLINIC | Age: 73
End: 2023-07-07
Payer: MEDICARE

## 2023-07-07 VITALS
BODY MASS INDEX: 26.21 KG/M2 | HEART RATE: 73 BPM | TEMPERATURE: 98.2 F | OXYGEN SATURATION: 98 % | SYSTOLIC BLOOD PRESSURE: 120 MMHG | HEIGHT: 59 IN | RESPIRATION RATE: 12 BRPM | WEIGHT: 130 LBS | DIASTOLIC BLOOD PRESSURE: 80 MMHG

## 2023-07-07 DIAGNOSIS — Z23 ENCOUNTER FOR IMMUNIZATION: ICD-10-CM

## 2023-07-07 PROCEDURE — G0009: CPT

## 2023-07-07 PROCEDURE — 90677 PCV20 VACCINE IM: CPT

## 2023-07-07 PROCEDURE — 90715 TDAP VACCINE 7 YRS/> IM: CPT | Mod: GY

## 2023-07-07 PROCEDURE — 90472 IMMUNIZATION ADMIN EACH ADD: CPT

## 2023-07-07 PROCEDURE — 99214 OFFICE O/P EST MOD 30 MIN: CPT | Mod: 25

## 2023-07-07 RX ORDER — FAMOTIDINE 40 MG/1
40 TABLET, FILM COATED ORAL
Refills: 0 | Status: ACTIVE | COMMUNITY
Start: 2023-07-07

## 2023-07-08 NOTE — REVIEW OF SYSTEMS
[Anxiety] : anxiety [Negative] : Musculoskeletal [Fatigue] : no fatigue [Recent Change In Weight] : ~T no recent weight change [Nosebleed] : no nosebleeds [Postnasal Drip] : no postnasal drip [FreeTextEntry3] : as in hpi

## 2023-07-08 NOTE — HISTORY OF PRESENT ILLNESS
[de-identified] : JOMAR MTZ is a 72 year old F who presents today for Klonopin refill and FUV. \par \par The patient mentioned that she experienced anxiety while she was in [place]. Was there for 3 months.\par \par Some GI recurrence of  belching and postprandial nausea.\par She tried making an appointment with Dr. Araujo but could not get one for 6 weeks.\par Consulted with her GI in [facility], Dr. Roblero;  consultation reviewed; GUTIERREZ unremarkable. \par Advised   likely IBS. " Somewhat Better". \par \par Endorses she  had her right cataract done in June;  "very satisfied"  and she will have her left cataract completed next Wednesday.\par \par Otherwise has been well and denies any [place] injuries, ER visits or hospitalizations. \par Endorses compliance with medications. \par \par Family "all good".  [FreeTextEntry1] : 6M F/U \par Medication refill on Klonopin she had a quarter this morning and half of a Zoloft\par Chronic anxiety \par NKDA\par [employer] [facility]

## 2023-07-08 NOTE — ADDENDUM
[FreeTextEntry1] : Medical record entries made by the scribe today, were at my direction and personally dictated to\par them by me, Dr. Sanjuana Vieira on 07/07/2023. I have reviewed the chart and\par agree that the record accurately reflects my personal performance of the history, physical exam,\par assessment and plan.\par \par \par I, Cammy Long acting as scribe for Dr. Sanjuana Vieira MD on 07/07/2023 at\par 10:28 AM.\par

## 2023-07-08 NOTE — PHYSICAL EXAM
[No Acute Distress] : no acute distress [Normal Sclera/Conjunctiva] : normal sclera/conjunctiva [No JVD] : no jugular venous distention [No Respiratory Distress] : no respiratory distress  [No Accessory Muscle Use] : no accessory muscle use [Normal Rate] : normal rate  [Regular Rhythm] : with a regular rhythm [Soft] : abdomen soft [Non Tender] : non-tender [No HSM] : no HSM [No Spinal Tenderness] : no spinal tenderness [No Focal Deficits] : no focal deficits [Speech Grossly Normal] : speech grossly normal [Alert and Oriented x3] : oriented to person, place, and time [de-identified] : calm and engaging [de-identified] : KIM

## 2023-07-08 NOTE — PLAN
[FreeTextEntry1] : The patient was administered pneumonia and tetanus vaccinations in the office today.

## 2023-07-08 NOTE — ASSESSMENT
[FreeTextEntry1] : The following plan was discussed with patient:\par \par #1  Anxiety DO  will allow for PRN Klonopin. \par Continue regular exercise and sleep. \par \par #2  HM  Tdap given and PCV 20 \par \par FU in  6 months for CPE. \par \par Best wishes offered!\par

## 2023-07-12 ENCOUNTER — NON-APPOINTMENT (OUTPATIENT)
Age: 73
End: 2023-07-12

## 2023-07-12 ENCOUNTER — APPOINTMENT (OUTPATIENT)
Dept: OPHTHALMOLOGY | Facility: AMBULATORY MEDICAL SERVICES | Age: 73
End: 2023-07-12
Payer: MEDICARE

## 2023-07-12 PROCEDURE — V2787: CPT

## 2023-07-12 PROCEDURE — 66984 XCAPSL CTRC RMVL W/O ECP: CPT | Mod: 79,LT

## 2023-07-13 ENCOUNTER — APPOINTMENT (OUTPATIENT)
Dept: OPHTHALMOLOGY | Facility: CLINIC | Age: 73
End: 2023-07-13
Payer: MEDICARE

## 2023-07-13 ENCOUNTER — NON-APPOINTMENT (OUTPATIENT)
Age: 73
End: 2023-07-13

## 2023-07-13 LAB
ALBUMIN SERPL ELPH-MCNC: 4.1 G/DL
ALP BLD-CCNC: 64 U/L
ALT SERPL-CCNC: 42 U/L
ANION GAP SERPL CALC-SCNC: 10 MMOL/L
AST SERPL-CCNC: 20 U/L
BILIRUB SERPL-MCNC: 0.5 MG/DL
BUN SERPL-MCNC: 14 MG/DL
CALCIUM SERPL-MCNC: 9.6 MG/DL
CHLORIDE SERPL-SCNC: 104 MMOL/L
CHOLEST SERPL-MCNC: 196 MG/DL
CO2 SERPL-SCNC: 29 MMOL/L
CREAT SERPL-MCNC: 0.61 MG/DL
EGFR: 95 ML/MIN/1.73M2
GLUCOSE SERPL-MCNC: 89 MG/DL
HDLC SERPL-MCNC: 44 MG/DL
LDLC SERPL CALC-MCNC: 127 MG/DL
NONHDLC SERPL-MCNC: 152 MG/DL
POTASSIUM SERPL-SCNC: 4.7 MMOL/L
PROT SERPL-MCNC: 6.6 G/DL
SODIUM SERPL-SCNC: 143 MMOL/L
TRIGL SERPL-MCNC: 127 MG/DL

## 2023-07-13 PROCEDURE — 99024 POSTOP FOLLOW-UP VISIT: CPT

## 2023-07-20 ENCOUNTER — APPOINTMENT (OUTPATIENT)
Dept: OPHTHALMOLOGY | Facility: CLINIC | Age: 73
End: 2023-07-20
Payer: MEDICARE

## 2023-07-20 ENCOUNTER — NON-APPOINTMENT (OUTPATIENT)
Age: 73
End: 2023-07-20

## 2023-07-20 PROCEDURE — 99024 POSTOP FOLLOW-UP VISIT: CPT

## 2023-08-10 ENCOUNTER — APPOINTMENT (OUTPATIENT)
Dept: OPHTHALMOLOGY | Facility: CLINIC | Age: 73
End: 2023-08-10
Payer: MEDICARE

## 2023-08-10 ENCOUNTER — NON-APPOINTMENT (OUTPATIENT)
Age: 73
End: 2023-08-10

## 2023-08-10 PROCEDURE — 99024 POSTOP FOLLOW-UP VISIT: CPT

## 2023-08-16 ENCOUNTER — NON-APPOINTMENT (OUTPATIENT)
Age: 73
End: 2023-08-16

## 2023-10-13 ENCOUNTER — APPOINTMENT (OUTPATIENT)
Dept: MRI IMAGING | Facility: CLINIC | Age: 73
End: 2023-10-13
Payer: MEDICARE

## 2023-10-13 PROCEDURE — 70553 MRI BRAIN STEM W/O & W/DYE: CPT | Mod: MH

## 2023-10-13 PROCEDURE — A9585: CPT | Mod: JW

## 2023-11-10 ENCOUNTER — NON-APPOINTMENT (OUTPATIENT)
Age: 73
End: 2023-11-10

## 2023-11-13 ENCOUNTER — NON-APPOINTMENT (OUTPATIENT)
Age: 73
End: 2023-11-13

## 2023-11-13 ENCOUNTER — APPOINTMENT (OUTPATIENT)
Dept: OPHTHALMOLOGY | Facility: CLINIC | Age: 73
End: 2023-11-13
Payer: MEDICARE

## 2023-11-13 PROCEDURE — 92012 INTRM OPH EXAM EST PATIENT: CPT

## 2023-12-05 ENCOUNTER — APPOINTMENT (OUTPATIENT)
Dept: FAMILY MEDICINE | Facility: CLINIC | Age: 73
End: 2023-12-05

## 2023-12-05 DIAGNOSIS — H81.10 BENIGN PAROXYSMAL VERTIGO, UNSPECIFIED EAR: ICD-10-CM

## 2023-12-05 DIAGNOSIS — K21.9 GASTRO-ESOPHAGEAL REFLUX DISEASE W/OUT ESOPHAGITIS: ICD-10-CM

## 2023-12-05 DIAGNOSIS — Z00.00 ENCOUNTER FOR GENERAL ADULT MEDICAL EXAMINATION W/OUT ABNORMAL FINDINGS: ICD-10-CM

## 2023-12-09 ENCOUNTER — LABORATORY RESULT (OUTPATIENT)
Age: 73
End: 2023-12-09

## 2023-12-10 LAB
ALBUMIN SERPL ELPH-MCNC: 3.8 G/DL
ALP BLD-CCNC: 56 U/L
ALT SERPL-CCNC: 23 U/L
ANION GAP SERPL CALC-SCNC: 10 MMOL/L
APPEARANCE: CLEAR
AST SERPL-CCNC: 18 U/L
BASOPHILS # BLD AUTO: 0.03 K/UL
BASOPHILS NFR BLD AUTO: 0.4 %
BILIRUB SERPL-MCNC: 0.3 MG/DL
BILIRUBIN URINE: NEGATIVE
BLOOD URINE: NEGATIVE
BUN SERPL-MCNC: 17 MG/DL
CALCIUM SERPL-MCNC: 9 MG/DL
CHLORIDE SERPL-SCNC: 103 MMOL/L
CHOLEST SERPL-MCNC: 141 MG/DL
CO2 SERPL-SCNC: 28 MMOL/L
COLOR: YELLOW
CREAT SERPL-MCNC: 0.53 MG/DL
EGFR: 98 ML/MIN/1.73M2
EOSINOPHIL # BLD AUTO: 0.06 K/UL
EOSINOPHIL NFR BLD AUTO: 0.8 %
ESTIMATED AVERAGE GLUCOSE: 108 MG/DL
GLUCOSE QUALITATIVE U: NEGATIVE MG/DL
GLUCOSE SERPL-MCNC: 80 MG/DL
HBA1C MFR BLD HPLC: 5.4 %
HCT VFR BLD CALC: 41.4 %
HDLC SERPL-MCNC: 31 MG/DL
HGB BLD-MCNC: 13.2 G/DL
IMM GRANULOCYTES NFR BLD AUTO: 0.9 %
KETONES URINE: NEGATIVE MG/DL
LDLC SERPL CALC-MCNC: 90 MG/DL
LEUKOCYTE ESTERASE URINE: ABNORMAL
LYMPHOCYTES # BLD AUTO: 1.77 K/UL
LYMPHOCYTES NFR BLD AUTO: 23.2 %
MAN DIFF?: NORMAL
MCHC RBC-ENTMCNC: 29.1 PG
MCHC RBC-ENTMCNC: 31.9 GM/DL
MCV RBC AUTO: 91.4 FL
MONOCYTES # BLD AUTO: 0.64 K/UL
MONOCYTES NFR BLD AUTO: 8.4 %
NEUTROPHILS # BLD AUTO: 5.06 K/UL
NEUTROPHILS NFR BLD AUTO: 66.3 %
NITRITE URINE: NEGATIVE
NONHDLC SERPL-MCNC: 110 MG/DL
PH URINE: 7
PLATELET # BLD AUTO: 261 K/UL
POTASSIUM SERPL-SCNC: 4 MMOL/L
PROT SERPL-MCNC: 6.1 G/DL
PROTEIN URINE: NEGATIVE MG/DL
RBC # BLD: 4.53 M/UL
RBC # FLD: 14.6 %
SODIUM SERPL-SCNC: 141 MMOL/L
SPECIFIC GRAVITY URINE: 1.01
TRIGL SERPL-MCNC: 108 MG/DL
TSH SERPL-ACNC: 1.51 UIU/ML
UROBILINOGEN URINE: 0.2 MG/DL
WBC # FLD AUTO: 7.63 K/UL

## 2023-12-19 ENCOUNTER — TRANSCRIPTION ENCOUNTER (OUTPATIENT)
Age: 73
End: 2023-12-19

## 2023-12-19 ENCOUNTER — APPOINTMENT (OUTPATIENT)
Dept: OBGYN | Facility: CLINIC | Age: 73
End: 2023-12-19

## 2023-12-21 ENCOUNTER — NON-APPOINTMENT (OUTPATIENT)
Age: 73
End: 2023-12-21

## 2023-12-21 ENCOUNTER — APPOINTMENT (OUTPATIENT)
Dept: CARDIOLOGY | Facility: CLINIC | Age: 73
End: 2023-12-21
Payer: MEDICARE

## 2023-12-21 VITALS
HEART RATE: 75 BPM | OXYGEN SATURATION: 98 % | WEIGHT: 123 LBS | BODY MASS INDEX: 24.8 KG/M2 | HEIGHT: 59 IN | SYSTOLIC BLOOD PRESSURE: 126 MMHG | DIASTOLIC BLOOD PRESSURE: 70 MMHG

## 2023-12-21 PROCEDURE — 93000 ELECTROCARDIOGRAM COMPLETE: CPT

## 2023-12-21 PROCEDURE — 99214 OFFICE O/P EST MOD 30 MIN: CPT

## 2023-12-21 NOTE — CARDIOLOGY SUMMARY
[de-identified] : 12/21/2023, NSR with possible MONICA 10/13/2022, NSR with poor R wave progression. [de-identified] : 1/7/2022, Pharmacologic Nuclear Stress Testing: no ischemia or evidence of prior infarction noted on SPECT images.\par   [de-identified] : 4/20/2023, LV EF 60-65%, mild MR, mild-moderate AI, mild TR with estimated PASP of 28mmHg.\par  1/7/2022, LV EF 60%, trace MR, mild-moderate AI, mild TR\par  12/9/2020, normal LV systolic function, mild AI, trace MR, trace TR with estimated PASP of 24mmHg.

## 2023-12-21 NOTE — HISTORY OF PRESENT ILLNESS
[FreeTextEntry1] : Historical Perspective: 71 year old female with history of HLD, mild aortic valve insufficiency presents for a cardiac evaluation. Patient suffers from anxiety at times and can get a discomfort in her chest when she is anxious. No associated SOB or palpitations.   There is no history of MI, CVA, CHF, or previous coronary intervention.  Current Health Status: Patient with no chest pain, SOB, or palpitations. No hospitalizations since seeing me last. Remains compliant with medications and reports no adverse effects.

## 2023-12-21 NOTE — DISCUSSION/SUMMARY
[FreeTextEntry1] : 1. Chest pain: induced by her anxiety. Normal pharmacologic nuclear stress testing, January 2022.   2. AI: mild-moderate on echocardiogram from April 2023 (stable from previous). Recommend periodic echo surveillance.   3. HLD: continue rosuvastatin 10mg daily.  Follow up in 6 months.     [EKG obtained to assist in diagnosis and management of assessed problem(s)] : EKG obtained to assist in diagnosis and management of assessed problem(s)

## 2023-12-21 NOTE — PHYSICAL EXAM
[Normal S1, S2] : normal S1, S2 [Normal] : moves all extremities, no focal deficits, normal speech [de-identified] : No carotid bruits auscultated bilaterally [de-identified] : 2/6 systolic ejection murmur

## 2023-12-29 ENCOUNTER — APPOINTMENT (OUTPATIENT)
Dept: FAMILY MEDICINE | Facility: CLINIC | Age: 73
End: 2023-12-29
Payer: MEDICARE

## 2023-12-29 VITALS
DIASTOLIC BLOOD PRESSURE: 66 MMHG | RESPIRATION RATE: 12 BRPM | HEIGHT: 59 IN | TEMPERATURE: 97.3 F | BODY MASS INDEX: 25.25 KG/M2 | SYSTOLIC BLOOD PRESSURE: 124 MMHG | OXYGEN SATURATION: 98 % | HEART RATE: 71 BPM | WEIGHT: 125.25 LBS

## 2023-12-29 DIAGNOSIS — E78.5 HYPERLIPIDEMIA, UNSPECIFIED: ICD-10-CM

## 2023-12-29 DIAGNOSIS — F41.9 ANXIETY DISORDER, UNSPECIFIED: ICD-10-CM

## 2023-12-29 PROCEDURE — 99214 OFFICE O/P EST MOD 30 MIN: CPT

## 2023-12-29 RX ORDER — CLONAZEPAM 0.5 MG/1
0.5 TABLET ORAL
Qty: 60 | Refills: 0 | Status: ACTIVE | COMMUNITY
Start: 1900-01-01 | End: 1900-01-01

## 2023-12-29 RX ORDER — SERTRALINE HYDROCHLORIDE 50 MG/1
50 TABLET, FILM COATED ORAL DAILY
Qty: 90 | Refills: 1 | Status: ACTIVE | COMMUNITY
Start: 2023-12-29 | End: 1900-01-01

## 2024-01-05 PROBLEM — F41.9 ANXIETY DISORDER: Status: ACTIVE | Noted: 2017-11-02

## 2024-01-05 PROBLEM — E78.5 DYSLIPIDEMIA: Status: ACTIVE | Noted: 2024-01-05

## 2024-01-05 NOTE — HISTORY OF PRESENT ILLNESS
[FreeTextEntry1] : Patient in office for f/u visit last seen 7/7/2023. GOING to [place] for 4 months.   Wants to discuss several issues  [de-identified] : Bruising and labs form GI    some bruising appreciated in photos or flat erythematous areas ranging from tiny to quarter sized on LE and distal forearms,  chest /abdomen/Back ALL SPARED. "They do go away." LABS reviewed  (and will be scanned ) are all normal.  Reassured: must be from banging area unconsciously.   AM mucus still a concern; does have considerable GERD. Famotidine 40 mgs  COVID  booster NO;    Advised  RSV  B/L KNEE OA  Dr. Colbert steroid shot and gel shots over the summer.   At her request, ADVISED Nutrofol for hair thinning.   We discuss her anxiety, and she agrees to return to daily use or low dose sertraline and PRN clonazepam will be continued.   Continues her regular exercise using "mirror" exercises.

## 2024-01-05 NOTE — PHYSICAL EXAM
[No Acute Distress] : no acute distress [Well-Appearing] : well-appearing [Normal Sclera/Conjunctiva] : normal sclera/conjunctiva [No JVD] : no jugular venous distention [No Respiratory Distress] : no respiratory distress  [No Accessory Muscle Use] : no accessory muscle use [Normal Rate] : normal rate  [Regular Rhythm] : with a regular rhythm [Soft] : abdomen soft [Non Tender] : non-tender [No HSM] : no HSM [No Spinal Tenderness] : no spinal tenderness [No Joint Swelling] : no joint swelling [No Focal Deficits] : no focal deficits [Speech Grossly Normal] : speech grossly normal [Alert and Oriented x3] : oriented to person, place, and time [de-identified] : engaging  [de-identified] : KIM [de-identified] : as in hpi

## 2024-01-05 NOTE — REVIEW OF SYSTEMS
[Abdominal Pain] : abdominal pain [Anxiety] : anxiety [Negative] : Neurological [Fatigue] : no fatigue [Night Sweats] : no night sweats [Discharge] : no discharge [Itching] : no itching [Dysuria] : no dysuria [Hematuria] : no hematuria [FreeTextEntry7] : chronic intermittent  full GUTIERREZ with GI negative  No weight loss  [de-identified] : as in hpi  [de-identified] : chronic

## 2024-01-11 LAB
HBA1C MFR BLD HPLC: 5.4
LDLC SERPL CALC-MCNC: 90

## 2024-01-16 ENCOUNTER — APPOINTMENT (OUTPATIENT)
Dept: FAMILY MEDICINE | Facility: CLINIC | Age: 74
End: 2024-01-16

## 2024-04-18 ENCOUNTER — APPOINTMENT (OUTPATIENT)
Dept: CARDIOLOGY | Facility: CLINIC | Age: 74
End: 2024-04-18
Payer: MEDICARE

## 2024-04-18 ENCOUNTER — NON-APPOINTMENT (OUTPATIENT)
Age: 74
End: 2024-04-18

## 2024-04-18 VITALS
HEART RATE: 67 BPM | SYSTOLIC BLOOD PRESSURE: 122 MMHG | WEIGHT: 122 LBS | BODY MASS INDEX: 24.6 KG/M2 | HEIGHT: 59 IN | DIASTOLIC BLOOD PRESSURE: 60 MMHG | OXYGEN SATURATION: 97 %

## 2024-04-18 DIAGNOSIS — I35.1 NONRHEUMATIC AORTIC (VALVE) INSUFFICIENCY: ICD-10-CM

## 2024-04-18 DIAGNOSIS — Z01.810 ENCOUNTER FOR PREPROCEDURAL CARDIOVASCULAR EXAMINATION: ICD-10-CM

## 2024-04-18 DIAGNOSIS — E78.00 PURE HYPERCHOLESTEROLEMIA, UNSPECIFIED: ICD-10-CM

## 2024-04-18 PROCEDURE — 93306 TTE W/DOPPLER COMPLETE: CPT

## 2024-04-18 PROCEDURE — 93000 ELECTROCARDIOGRAM COMPLETE: CPT

## 2024-04-18 PROCEDURE — G2211 COMPLEX E/M VISIT ADD ON: CPT

## 2024-04-18 PROCEDURE — 99214 OFFICE O/P EST MOD 30 MIN: CPT

## 2024-04-18 RX ORDER — SERTRALINE HYDROCHLORIDE 50 MG/1
50 TABLET, FILM COATED ORAL DAILY
Refills: 0 | Status: DISCONTINUED | COMMUNITY
End: 2024-04-18

## 2024-04-18 RX ORDER — ROSUVASTATIN CALCIUM 10 MG/1
10 TABLET, FILM COATED ORAL
Qty: 90 | Refills: 3 | Status: ACTIVE | COMMUNITY
Start: 2018-03-19 | End: 1900-01-01

## 2024-04-18 NOTE — HISTORY OF PRESENT ILLNESS
[FreeTextEntry1] : Historical Perspective: 73 year old female with history of HLD, mild aortic valve insufficiency presents for a cardiac evaluation. Patient suffers from anxiety at times and can get a discomfort in her chest when she is anxious. No associated SOB or palpitations.   There is no history of MI, CVA, CHF, or previous coronary intervention.  Current Health Status: Patient with no chest pain, SOB, or palpitations. No hospitalizations since seeing me last. Remains compliant with medications and reports no adverse effects. Going for knee replacement with Dr. Colbert, 4/30/2024.

## 2024-04-18 NOTE — DISCUSSION/SUMMARY
[FreeTextEntry1] : 1. Chest pain: induced by her anxiety. Normal pharmacologic nuclear stress testing, January 2022.   2. AI: mild-moderate on echocardiogram from April 2024 (stable from previous). Recommend periodic echo surveillance.   3. HLD: continue rosuvastatin 10mg daily.  The patient has greater than 4 METs activity level without exertional complaints. There are no acute ECG changes, no murmur of aortic stenosis, and no clinical signs of heart failure. Patient is optimized from a cardiology standpoint to undergo the planned surgical procedure.  Follow up in 6 months.     [EKG obtained to assist in diagnosis and management of assessed problem(s)] : EKG obtained to assist in diagnosis and management of assessed problem(s)

## 2024-04-18 NOTE — PHYSICAL EXAM
[Normal S1, S2] : normal S1, S2 [Normal] : moves all extremities, no focal deficits, normal speech [de-identified] : No carotid bruits auscultated bilaterally [de-identified] : 2/6 systolic ejection murmur

## 2024-04-18 NOTE — CARDIOLOGY SUMMARY
[de-identified] : 4/18/2024, NSR with poor R wave progression 12/21/2023, NSR with possible MONICA 10/13/2022, NSR with poor R wave progression. [de-identified] : 4/18/2024, LV EF 55%, mild MR, mild-moderate AI, mild TR 4/20/2023, LV EF 60-65%, mild MR, mild-moderate AI, mild TR with estimated PASP of 28mmHg. 1/7/2022, LV EF 60%, trace MR, mild-moderate AI, mild TR 12/9/2020, normal LV systolic function, mild AI, trace MR, trace TR with estimated PASP of 24mmHg. [de-identified] : 1/7/2022, Pharmacologic Nuclear Stress Testing: no ischemia or evidence of prior infarction noted on SPECT images.\par

## 2024-04-25 ENCOUNTER — APPOINTMENT (OUTPATIENT)
Dept: OPHTHALMOLOGY | Facility: CLINIC | Age: 74
End: 2024-04-25

## 2024-05-01 ENCOUNTER — TRANSCRIPTION ENCOUNTER (OUTPATIENT)
Age: 74
End: 2024-05-01

## 2024-05-02 ENCOUNTER — TRANSCRIPTION ENCOUNTER (OUTPATIENT)
Age: 74
End: 2024-05-02

## 2024-05-22 ENCOUNTER — TRANSCRIPTION ENCOUNTER (OUTPATIENT)
Age: 74
End: 2024-05-22

## 2024-06-14 ENCOUNTER — APPOINTMENT (OUTPATIENT)
Dept: CT IMAGING | Facility: CLINIC | Age: 74
End: 2024-06-14
Payer: MEDICARE

## 2024-06-14 PROCEDURE — 74177 CT ABD & PELVIS W/CONTRAST: CPT | Mod: MH

## 2024-07-25 ENCOUNTER — APPOINTMENT (OUTPATIENT)
Dept: OPHTHALMOLOGY | Facility: CLINIC | Age: 74
End: 2024-07-25
Payer: MEDICARE

## 2024-07-25 ENCOUNTER — NON-APPOINTMENT (OUTPATIENT)
Age: 74
End: 2024-07-25

## 2024-07-25 PROCEDURE — 92014 COMPRE OPH EXAM EST PT 1/>: CPT

## 2024-08-07 ENCOUNTER — APPOINTMENT (OUTPATIENT)
Dept: ORTHOPEDIC SURGERY | Facility: CLINIC | Age: 74
End: 2024-08-07

## 2024-08-07 PROCEDURE — 99213 OFFICE O/P EST LOW 20 MIN: CPT

## 2024-08-07 NOTE — HISTORY OF PRESENT ILLNESS
[de-identified] : 72F, RHD, PMHX of Anxiety, GERD, HLD presents with right shoulder pain since middle of March 2023. Patient reports was working out with her sister in law when she started to feel the pain. Admits to trying to rest to let it go away but hasn't. Denies outside imaging/treatment. Denies numbness/tingling.   06/16/23 Patient is here to follow up on the RT shoulder. MRI done at  imaging 06/08/23. Excedrin prn pain. PT 2x a week and trigger point injections seem to be helping.  08/07/24: f/u right shoulder. Patient reports that she had knee surgery on 04/30/24 and states that while she was attending PT for her knee, she believes she may have strained her right shoulder. Reports pain is aggravated with reaching behind herself. Patient also notes that her right hand feels stiff in the morning. Currently taking Meloxicam and tylenol PRN with minimal relief.  Denies numbness/tingling.

## 2024-08-07 NOTE — IMAGING
[de-identified] : RIGHT SHOULDER EXAM +ttp anterolateral, +ttp at proximal biceps Skin intact No muscle atrophy noted Shoulder ROM   Forward flexion to 160; contralateral shoulder 160   Abduction to 160   ER with elbow at side to 45; contralateral shoulder 45   IR to L1  + Marc empty can test + Hawkin's impingement test - Speed's test - Apprehension test - Lift-off test - Cross-body adduction test  Rotator cuff strength is 4/5 throughout Hand is warm and well perfused with brisk capillary refill throughout Motor function intact to axillary, AIN, PIN, and ulnar nerves Sensation intact axillary, median, ulnar, and superficial radial nerves Elbow and wrist motion intact and full Patient able to make a composite fist  Right shoulder radiographs with no fracture nor dislocation. Minimal arthrosis. Right shoulder MRI with supra tear, complete, <1cm..

## 2024-08-07 NOTE — ASSESSMENT
[FreeTextEntry1] : Right Subacromial Impingement and supra tear and proximal biceps tendonitis. The diagnosis and treatment options were discussed at length with the patient.  The pathophysiology of shoulder impingement syndrome was discussed, including bursitis in the subacromial space causing rotator cuff inflammation and pain with overhead activities.  Treatment options discussed including observation, activity modifications, oral anti-inflammatories, physical therapy, steroid injection, and advanced imaging. All of the patient's questions were answered to their satisfaction.  Plan for follow-up in 6 weeks time, and should the symptoms chelsey, the patient may cancel.  Will pursue PT at this time, consider CSI.  F/u prn

## 2024-08-28 ENCOUNTER — APPOINTMENT (OUTPATIENT)
Dept: ORTHOPEDIC SURGERY | Facility: CLINIC | Age: 74
End: 2024-08-28
Payer: MEDICARE

## 2024-08-28 VITALS — BODY MASS INDEX: 24.6 KG/M2 | WEIGHT: 122 LBS | HEIGHT: 59 IN

## 2024-08-28 DIAGNOSIS — M25.511 PAIN IN RIGHT SHOULDER: ICD-10-CM

## 2024-08-28 PROCEDURE — 20610 DRAIN/INJ JOINT/BURSA W/O US: CPT | Mod: RT

## 2024-08-28 PROCEDURE — 99214 OFFICE O/P EST MOD 30 MIN: CPT | Mod: 25

## 2024-08-28 PROCEDURE — 73030 X-RAY EXAM OF SHOULDER: CPT | Mod: RT

## 2024-09-02 PROBLEM — M25.511 RIGHT SHOULDER PAIN, UNSPECIFIED CHRONICITY: Status: ACTIVE | Noted: 2024-08-07

## 2024-09-02 NOTE — HISTORY OF PRESENT ILLNESS
[de-identified] : 72F, RHD, PMHX of Anxiety, GERD, HLD presents with right shoulder pain since middle of March 2023. Patient reports was working out with her sister in law when she started to feel the pain. Admits to trying to rest to let it go away but hasn't. Denies outside imaging/treatment. Denies numbness/tingling.   06/16/23 Patient is here to follow up on the RT shoulder. MRI done at  imaging 06/08/23. Excedrin prn pain. PT 2x a week and trigger point injections seem to be helping.  08/07/24: f/u right shoulder. Patient reports that she had knee surgery on 04/30/24 and states that while she was attending PT for her knee, she believes she may have strained her right shoulder. Reports pain is aggravated with reaching behind herself. Patient also notes that her right hand feels stiff in the morning. Currently taking Meloxicam and tylenol PRN with minimal relief.  Denies numbness/tingling.   08/28/24: f/u right shoulder. Patient reports that she is having pain in the anterior aspect of her right shoulder, worse with reaching backwards and movement such as reaching above her head. Patient reports that she had just begun PT for her shoulder the week of 08/19/24, stating that she has attended 5 visits so far with relief. Denies numbness/tingling.

## 2024-09-02 NOTE — IMAGING
[de-identified] : RIGHT SHOULDER EXAM +ttp anterolateral, +ttp at proximal biceps Skin intact No muscle atrophy noted Shoulder ROM   Forward flexion to 160; contralateral shoulder 160   Abduction to 160   ER with elbow at side to 45; contralateral shoulder 45   IR to L1  + Marc empty can test + Hawkin's impingement test - Speed's test - Apprehension test - Lift-off test - Cross-body adduction test  Rotator cuff strength is 4/5 throughout Hand is warm and well perfused with brisk capillary refill throughout Motor function intact to axillary, AIN, PIN, and ulnar nerves Sensation intact axillary, median, ulnar, and superficial radial nerves Elbow and wrist motion intact and full Patient able to make a composite fist  Right shoulder radiographs with no fracture nor dislocation. Minimal arthrosis. Right shoulder MRI with supra tear, complete, <1cm..

## 2024-09-02 NOTE — IMAGING
[de-identified] : RIGHT SHOULDER EXAM +ttp anterolateral, +ttp at proximal biceps Skin intact No muscle atrophy noted Shoulder ROM   Forward flexion to 160; contralateral shoulder 160   Abduction to 160   ER with elbow at side to 45; contralateral shoulder 45   IR to L1  + Marc empty can test + Hawkin's impingement test - Speed's test - Apprehension test - Lift-off test - Cross-body adduction test  Rotator cuff strength is 4/5 throughout Hand is warm and well perfused with brisk capillary refill throughout Motor function intact to axillary, AIN, PIN, and ulnar nerves Sensation intact axillary, median, ulnar, and superficial radial nerves Elbow and wrist motion intact and full Patient able to make a composite fist  Right shoulder radiographs with no fracture nor dislocation. Minimal arthrosis. Right shoulder MRI with supra tear, complete, <1cm..

## 2024-09-02 NOTE — HISTORY OF PRESENT ILLNESS
[de-identified] : 72F, RHD, PMHX of Anxiety, GERD, HLD presents with right shoulder pain since middle of March 2023. Patient reports was working out with her sister in law when she started to feel the pain. Admits to trying to rest to let it go away but hasn't. Denies outside imaging/treatment. Denies numbness/tingling.   06/16/23 Patient is here to follow up on the RT shoulder. MRI done at  imaging 06/08/23. Excedrin prn pain. PT 2x a week and trigger point injections seem to be helping.  08/07/24: f/u right shoulder. Patient reports that she had knee surgery on 04/30/24 and states that while she was attending PT for her knee, she believes she may have strained her right shoulder. Reports pain is aggravated with reaching behind herself. Patient also notes that her right hand feels stiff in the morning. Currently taking Meloxicam and tylenol PRN with minimal relief.  Denies numbness/tingling.   08/28/24: f/u right shoulder. Patient reports that she is having pain in the anterior aspect of her right shoulder, worse with reaching backwards and movement such as reaching above her head. Patient reports that she had just begun PT for her shoulder the week of 08/19/24, stating that she has attended 5 visits so far with relief. Denies numbness/tingling.

## 2024-09-02 NOTE — IMAGING
[de-identified] : RIGHT SHOULDER EXAM +ttp anterolateral, +ttp at proximal biceps Skin intact No muscle atrophy noted Shoulder ROM   Forward flexion to 160; contralateral shoulder 160   Abduction to 160   ER with elbow at side to 45; contralateral shoulder 45   IR to L1  + Marc empty can test + Hawkin's impingement test - Speed's test - Apprehension test - Lift-off test - Cross-body adduction test  Rotator cuff strength is 4/5 throughout Hand is warm and well perfused with brisk capillary refill throughout Motor function intact to axillary, AIN, PIN, and ulnar nerves Sensation intact axillary, median, ulnar, and superficial radial nerves Elbow and wrist motion intact and full Patient able to make a composite fist  Right shoulder radiographs with no fracture nor dislocation. Minimal arthrosis. Right shoulder MRI with supra tear, complete, <1cm..

## 2024-09-02 NOTE — ASSESSMENT
[FreeTextEntry1] : Right Subacromial Impingement and supra tear and proximal biceps tendonitis. The diagnosis and treatment options were discussed at length with the patient.  The pathophysiology of shoulder impingement syndrome was discussed, including bursitis in the subacromial space causing rotator cuff inflammation and pain with overhead activities.  Treatment options discussed including observation, activity modifications, oral anti-inflammatories, physical therapy, steroid injection, and advanced imaging. All of the patient's questions were answered to their satisfaction.  Plan for follow-up in 6 weeks time, and should the symptoms chelsey, the patient may cancel. After discussion of risks/benefits, including glucose intolerance in the diabetic population, patient elected to proceed with injection to the shoulder. Procedure 1 - 5.0cc 1% lidocaine + 1.0cc 40mg/cc Kenalog administered to the right glenohuemral joint and proximal biceps tendon following sterilization with Betadine. Patient tolerated this well.  F/u 3mo

## 2024-10-15 ENCOUNTER — NON-APPOINTMENT (OUTPATIENT)
Age: 74
End: 2024-10-15

## 2024-10-15 ENCOUNTER — APPOINTMENT (OUTPATIENT)
Dept: CARDIOLOGY | Facility: CLINIC | Age: 74
End: 2024-10-15
Payer: MEDICARE

## 2024-10-15 VITALS
SYSTOLIC BLOOD PRESSURE: 110 MMHG | OXYGEN SATURATION: 97 % | DIASTOLIC BLOOD PRESSURE: 64 MMHG | HEIGHT: 59 IN | BODY MASS INDEX: 24.19 KG/M2 | WEIGHT: 120 LBS | HEART RATE: 59 BPM

## 2024-10-15 DIAGNOSIS — I35.1 NONRHEUMATIC AORTIC (VALVE) INSUFFICIENCY: ICD-10-CM

## 2024-10-15 DIAGNOSIS — E78.00 PURE HYPERCHOLESTEROLEMIA, UNSPECIFIED: ICD-10-CM

## 2024-10-15 PROCEDURE — 99214 OFFICE O/P EST MOD 30 MIN: CPT

## 2024-10-15 PROCEDURE — G2211 COMPLEX E/M VISIT ADD ON: CPT

## 2024-10-15 PROCEDURE — 93000 ELECTROCARDIOGRAM COMPLETE: CPT

## 2024-10-15 RX ORDER — DESVENLAFAXINE SUCCINATE 50 MG/1
50 TABLET, EXTENDED RELEASE ORAL DAILY
Refills: 0 | Status: ACTIVE | COMMUNITY

## 2024-10-15 RX ORDER — ROSUVASTATIN CALCIUM 20 MG/1
20 TABLET, FILM COATED ORAL
Qty: 90 | Refills: 3 | Status: ACTIVE | COMMUNITY
Start: 1900-01-01 | End: 1900-01-01

## 2024-12-12 ENCOUNTER — APPOINTMENT (OUTPATIENT)
Dept: MRI IMAGING | Facility: CLINIC | Age: 74
End: 2024-12-12
Payer: MEDICARE

## 2024-12-12 PROCEDURE — 72148 MRI LUMBAR SPINE W/O DYE: CPT | Mod: MH

## 2024-12-12 PROCEDURE — 72195 MRI PELVIS W/O DYE: CPT | Mod: MH

## 2025-04-30 ENCOUNTER — NON-APPOINTMENT (OUTPATIENT)
Age: 75
End: 2025-04-30

## 2025-05-01 ENCOUNTER — APPOINTMENT (OUTPATIENT)
Dept: CARDIOLOGY | Facility: CLINIC | Age: 75
End: 2025-05-01
Payer: MEDICARE

## 2025-05-01 ENCOUNTER — NON-APPOINTMENT (OUTPATIENT)
Age: 75
End: 2025-05-01

## 2025-05-01 VITALS
WEIGHT: 123 LBS | DIASTOLIC BLOOD PRESSURE: 70 MMHG | HEIGHT: 59 IN | SYSTOLIC BLOOD PRESSURE: 112 MMHG | BODY MASS INDEX: 24.8 KG/M2 | HEART RATE: 68 BPM | OXYGEN SATURATION: 97 %

## 2025-05-01 DIAGNOSIS — I35.1 NONRHEUMATIC AORTIC (VALVE) INSUFFICIENCY: ICD-10-CM

## 2025-05-01 DIAGNOSIS — I25.10 ATHEROSCLEROTIC HEART DISEASE OF NATIVE CORONARY ARTERY W/OUT ANGINA PECTORIS: ICD-10-CM

## 2025-05-01 DIAGNOSIS — I77.810 THORACIC AORTIC ECTASIA: ICD-10-CM

## 2025-05-01 DIAGNOSIS — E78.00 PURE HYPERCHOLESTEROLEMIA, UNSPECIFIED: ICD-10-CM

## 2025-05-01 PROCEDURE — 99214 OFFICE O/P EST MOD 30 MIN: CPT

## 2025-05-01 PROCEDURE — G2211 COMPLEX E/M VISIT ADD ON: CPT

## 2025-05-01 PROCEDURE — 93000 ELECTROCARDIOGRAM COMPLETE: CPT

## 2025-05-21 ENCOUNTER — RESULT REVIEW (OUTPATIENT)
Age: 75
End: 2025-05-21

## 2025-06-17 ENCOUNTER — APPOINTMENT (OUTPATIENT)
Dept: OPHTHALMOLOGY | Facility: CLINIC | Age: 75
End: 2025-06-17
Payer: MEDICARE

## 2025-06-17 ENCOUNTER — NON-APPOINTMENT (OUTPATIENT)
Age: 75
End: 2025-06-17

## 2025-06-17 PROCEDURE — 99213 OFFICE O/P EST LOW 20 MIN: CPT

## 2025-08-14 ENCOUNTER — APPOINTMENT (OUTPATIENT)
Dept: OPHTHALMOLOGY | Facility: CLINIC | Age: 75
End: 2025-08-14
Payer: MEDICARE

## 2025-08-14 ENCOUNTER — NON-APPOINTMENT (OUTPATIENT)
Age: 75
End: 2025-08-14

## 2025-08-14 PROCEDURE — 99213 OFFICE O/P EST LOW 20 MIN: CPT
